# Patient Record
Sex: FEMALE | Race: WHITE | NOT HISPANIC OR LATINO | URBAN - METROPOLITAN AREA
[De-identification: names, ages, dates, MRNs, and addresses within clinical notes are randomized per-mention and may not be internally consistent; named-entity substitution may affect disease eponyms.]

---

## 2017-02-20 ENCOUNTER — INPATIENT (INPATIENT)
Facility: HOSPITAL | Age: 67
LOS: 13 days | Discharge: NURSING, REHAB OR LONG-TERM CARE | End: 2017-03-06
Attending: HOSPITALIST | Admitting: HOSPITALIST
Payer: MEDICARE

## 2017-02-20 VITALS
DIASTOLIC BLOOD PRESSURE: 95 MMHG | RESPIRATION RATE: 18 BRPM | SYSTOLIC BLOOD PRESSURE: 161 MMHG | HEART RATE: 97 BPM | TEMPERATURE: 98 F | OXYGEN SATURATION: 98 %

## 2017-02-20 DIAGNOSIS — C02.9 MALIGNANT NEOPLASM OF TONGUE, UNSPECIFIED: ICD-10-CM

## 2017-02-20 DIAGNOSIS — I10 ESSENTIAL (PRIMARY) HYPERTENSION: ICD-10-CM

## 2017-02-20 DIAGNOSIS — F32.9 MAJOR DEPRESSIVE DISORDER, SINGLE EPISODE, UNSPECIFIED: ICD-10-CM

## 2017-02-20 LAB
ALBUMIN SERPL ELPH-MCNC: 4.2 G/DL — SIGNIFICANT CHANGE UP (ref 3.3–5)
ALP SERPL-CCNC: 162 U/L — HIGH (ref 40–120)
ALT FLD-CCNC: 16 U/L — SIGNIFICANT CHANGE UP (ref 4–33)
AST SERPL-CCNC: 29 U/L — SIGNIFICANT CHANGE UP (ref 4–32)
BASE EXCESS BLDV CALC-SCNC: 3.8 MMOL/L — SIGNIFICANT CHANGE UP
BASOPHILS # BLD AUTO: 0.02 K/UL — SIGNIFICANT CHANGE UP (ref 0–0.2)
BASOPHILS NFR BLD AUTO: 0.2 % — SIGNIFICANT CHANGE UP (ref 0–2)
BILIRUB SERPL-MCNC: 0.4 MG/DL — SIGNIFICANT CHANGE UP (ref 0.2–1.2)
BLOOD GAS VENOUS - CREATININE: 0.84 MG/DL — SIGNIFICANT CHANGE UP (ref 0.5–1.3)
BUN SERPL-MCNC: 13 MG/DL — SIGNIFICANT CHANGE UP (ref 7–23)
CALCIUM SERPL-MCNC: 10.3 MG/DL — SIGNIFICANT CHANGE UP (ref 8.4–10.5)
CHLORIDE BLDV-SCNC: 94 MMOL/L — LOW (ref 96–108)
CHLORIDE SERPL-SCNC: 91 MMOL/L — LOW (ref 98–107)
CO2 SERPL-SCNC: 23 MMOL/L — SIGNIFICANT CHANGE UP (ref 22–31)
CREAT SERPL-MCNC: 0.91 MG/DL — SIGNIFICANT CHANGE UP (ref 0.5–1.3)
EOSINOPHIL # BLD AUTO: 0.07 K/UL — SIGNIFICANT CHANGE UP (ref 0–0.5)
EOSINOPHIL NFR BLD AUTO: 0.6 % — SIGNIFICANT CHANGE UP (ref 0–6)
GAS PNL BLDV: 135 MMOL/L — LOW (ref 136–146)
GLUCOSE BLDV-MCNC: 164 — HIGH (ref 70–99)
GLUCOSE SERPL-MCNC: 156 MG/DL — HIGH (ref 70–99)
HCO3 BLDV-SCNC: 25 MMOL/L — SIGNIFICANT CHANGE UP (ref 20–27)
HCT VFR BLD CALC: 44.8 % — SIGNIFICANT CHANGE UP (ref 34.5–45)
HCT VFR BLDV CALC: 46.9 % — HIGH (ref 34.5–45)
HGB BLD-MCNC: 15 G/DL — SIGNIFICANT CHANGE UP (ref 11.5–15.5)
HGB BLDV-MCNC: 15.3 G/DL — SIGNIFICANT CHANGE UP (ref 11.5–15.5)
IMM GRANULOCYTES NFR BLD AUTO: 0.2 % — SIGNIFICANT CHANGE UP (ref 0–1.5)
LACTATE BLDV-MCNC: 2.9 MMOL/L — HIGH (ref 0.5–2)
LYMPHOCYTES # BLD AUTO: 1.55 K/UL — SIGNIFICANT CHANGE UP (ref 1–3.3)
LYMPHOCYTES # BLD AUTO: 12.3 % — LOW (ref 13–44)
MCHC RBC-ENTMCNC: 29.6 PG — SIGNIFICANT CHANGE UP (ref 27–34)
MCHC RBC-ENTMCNC: 33.5 % — SIGNIFICANT CHANGE UP (ref 32–36)
MCV RBC AUTO: 88.5 FL — SIGNIFICANT CHANGE UP (ref 80–100)
MONOCYTES # BLD AUTO: 0.73 K/UL — SIGNIFICANT CHANGE UP (ref 0–0.9)
MONOCYTES NFR BLD AUTO: 5.8 % — SIGNIFICANT CHANGE UP (ref 2–14)
NEUTROPHILS # BLD AUTO: 10.25 K/UL — HIGH (ref 1.8–7.4)
NEUTROPHILS NFR BLD AUTO: 80.9 % — HIGH (ref 43–77)
PCO2 BLDV: 57 MMHG — HIGH (ref 41–51)
PH BLDV: 7.33 PH — SIGNIFICANT CHANGE UP (ref 7.32–7.43)
PLATELET # BLD AUTO: 396 K/UL — SIGNIFICANT CHANGE UP (ref 150–400)
PMV BLD: 9.7 FL — SIGNIFICANT CHANGE UP (ref 7–13)
PO2 BLDV: 27 MMHG — LOW (ref 35–40)
POTASSIUM BLDV-SCNC: 4.1 MMOL/L — SIGNIFICANT CHANGE UP (ref 3.4–4.5)
POTASSIUM SERPL-MCNC: 4.3 MMOL/L — SIGNIFICANT CHANGE UP (ref 3.5–5.3)
POTASSIUM SERPL-SCNC: 4.3 MMOL/L — SIGNIFICANT CHANGE UP (ref 3.5–5.3)
PROT SERPL-MCNC: 8.8 G/DL — HIGH (ref 6–8.3)
RBC # BLD: 5.06 M/UL — SIGNIFICANT CHANGE UP (ref 3.8–5.2)
RBC # FLD: 13.5 % — SIGNIFICANT CHANGE UP (ref 10.3–14.5)
SAO2 % BLDV: 38.6 % — LOW (ref 60–85)
SODIUM SERPL-SCNC: 135 MMOL/L — SIGNIFICANT CHANGE UP (ref 135–145)
TSH SERPL-MCNC: 3.89 UIU/ML — SIGNIFICANT CHANGE UP (ref 0.27–4.2)
WBC # BLD: 12.65 K/UL — HIGH (ref 3.8–10.5)
WBC # FLD AUTO: 12.65 K/UL — HIGH (ref 3.8–10.5)

## 2017-02-20 PROCEDURE — 70450 CT HEAD/BRAIN W/O DYE: CPT | Mod: 26

## 2017-02-20 PROCEDURE — 99223 1ST HOSP IP/OBS HIGH 75: CPT

## 2017-02-20 PROCEDURE — 71020: CPT | Mod: 26

## 2017-02-20 PROCEDURE — 70491 CT SOFT TISSUE NECK W/DYE: CPT | Mod: 26

## 2017-02-20 RX ORDER — SODIUM CHLORIDE 9 MG/ML
1000 INJECTION INTRAMUSCULAR; INTRAVENOUS; SUBCUTANEOUS ONCE
Qty: 0 | Refills: 0 | Status: COMPLETED | OUTPATIENT
Start: 2017-02-20 | End: 2017-02-20

## 2017-02-20 RX ORDER — FLUTICASONE PROPIONATE AND SALMETEROL 50; 250 UG/1; UG/1
1 POWDER ORAL; RESPIRATORY (INHALATION)
Qty: 0 | Refills: 0 | COMMUNITY

## 2017-02-20 RX ORDER — ARIPIPRAZOLE 15 MG/1
20 TABLET ORAL DAILY
Qty: 0 | Refills: 0 | Status: DISCONTINUED | OUTPATIENT
Start: 2017-02-20 | End: 2017-03-06

## 2017-02-20 RX ORDER — SODIUM CHLORIDE 9 MG/ML
1000 INJECTION INTRAMUSCULAR; INTRAVENOUS; SUBCUTANEOUS
Qty: 0 | Refills: 0 | Status: DISCONTINUED | OUTPATIENT
Start: 2017-02-20 | End: 2017-02-26

## 2017-02-20 RX ORDER — AMLODIPINE BESYLATE 2.5 MG/1
5 TABLET ORAL ONCE
Qty: 0 | Refills: 0 | Status: COMPLETED | OUTPATIENT
Start: 2017-02-20 | End: 2017-02-20

## 2017-02-20 RX ADMIN — Medication 100 MILLIGRAM(S): at 21:53

## 2017-02-20 RX ADMIN — AMLODIPINE BESYLATE 5 MILLIGRAM(S): 2.5 TABLET ORAL at 19:38

## 2017-02-20 RX ADMIN — SODIUM CHLORIDE 1000 MILLILITER(S): 9 INJECTION INTRAMUSCULAR; INTRAVENOUS; SUBCUTANEOUS at 12:20

## 2017-02-20 RX ADMIN — SODIUM CHLORIDE 75 MILLILITER(S): 9 INJECTION INTRAMUSCULAR; INTRAVENOUS; SUBCUTANEOUS at 20:03

## 2017-02-20 RX ADMIN — ARIPIPRAZOLE 20 MILLIGRAM(S): 15 TABLET ORAL at 21:54

## 2017-02-20 NOTE — ED PROVIDER NOTE - CARE PLAN
Principal Discharge DX:	Tongue cancer Principal Discharge DX:	Tongue cancer  Secondary Diagnosis:	Ambulatory dysfunction

## 2017-02-20 NOTE — H&P ADULT. - PROBLEM SELECTOR PLAN 1
-seen by ent who are recommending IR guided FNA  -spoke with IR fellow this evening, and advised to call at 8am tomorrow for formal consult request. Recommend npo pot-midnight in event that pt is scheduled tomorrow.  PLEASE ENSURE IR AWARE OF SUSPICIOUS FINDING IN LEFT NASAL CAVITY REPORTED ON CT  -bedside swallow eval ordered -seen by ent who are recommending IR guided FNA  -spoke with IR fellow this evening, and advised to call at 8am tomorrow for formal consult request [id number]. Recommend npo pot-midnight in event that pt is scheduled tomorrow.  PLEASE ENSURE IR AWARE OF SUSPICIOUS FINDING IN LEFT NASAL CAVITY REPORTED ON CT  -bedside swallow eval ordered

## 2017-02-20 NOTE — H&P ADULT. - HISTORY OF PRESENT ILLNESS
67 yo f with h/o depression/anxiety, htn, hld, well controlled asthma with no exacerbation for many years.  Notes profound fatigue that has worsened over last several weeks to point where pt unable to ambulate over last 1-2 days. Pt also endorsing difficultly swallowing/consequent anorexia in setting of right tongue lesion/pain that she first noticed last month??. Pt denies fevers, chills, coughing with po intake, or any dyspnea. Denies focal weakness, numbness or recent falls. CT head + small vessel white matter ischemic changes. CT neck :5.8 x2.5 x2.3 cm mass on right tongue with lateral area of ulceration. [place] tonsil with hypoglossus and genioglossus muscle involvement. 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 Polypoid soft-tissue focus in left nasal cavity

## 2017-02-20 NOTE — ED ADULT NURSE NOTE - OBJECTIVE STATEMENT
Pt AOx3, unkempt, poor hygiene c/o b/l leg and arm weakness. Pt noted to have tongue with ulcerated, bumps/nodules/tumors for over a month now causing slurring of speech this past week. Pt admits that this has caused her to eat less, not taking care of herself for months and not taking any of her prescribed medications. Equal strength bilaterally.  Pt denies pain, fever chills. Pt AOx3, unkempt, poor hygiene c/o b/l leg and arm weakness. Pt noted to have tongue with heterozygous partially oozing mass, nodules/tumors mostly on the right side for over a month now causing slurring of speech this past week. Pt admits that this has caused her to eat less, not taking care of herself for months and not taking any of her prescribed medications. Equal strength bilaterally.  Pt denies pain, fever chills.

## 2017-02-20 NOTE — ED PROVIDER NOTE - OBJECTIVE STATEMENT
Pt. is 67yo F PMHx HTN HLD Depression Anxiety p/w CC generalized weakness/dysarthria. Pt. states that over the past couple of days she has become progressively weak in both upper and lower extremities to the point that she can not even stand today - she also has been complaining of dysarthria and the sensation of "tongue swelling" for over a month. Pt. has not left the house in over a year. Denies fever, chills, CP, SOB, N/V/D, numbness/tingling.

## 2017-02-20 NOTE — ED PROVIDER NOTE - MEDICAL DECISION MAKING DETAILS
65yo F PMHx HTN HLD Depression p/w CC generalized weakness, dysarthria will send for cbc cmp vbg CT head, CT neck soft tissue, consult OMFS

## 2017-02-20 NOTE — H&P ADULT. - RADIOLOGY RESULTS AND INTERPRETATION
CT head + small vessel white matter ischemic changes. CT neck :5.8 x2.5 x2.3 cm mass on right tongue with lateral area of ulceration. [place] tonsil with hypoglossus and genioglossus muscle involvement. 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 Polypoid soft-tissue focus in left nasal cavity

## 2017-02-20 NOTE — ED ADULT TRIAGE NOTE - CHIEF COMPLAINT QUOTE
Brought in by ems from home for weakness to bilateral upper and lower extremities for "few days". Denies falls, fevers, or pain.  Hx of htn, depression and asthma.

## 2017-02-20 NOTE — ED PROVIDER NOTE - ATTENDING CONTRIBUTION TO CARE
66F p/w gen weakness and trouble speaking/swallowing.  Pt reports unable to stand or walk even a few feet.  Notes also tongue swelling x 1 month.  VS:  unremarkable except hypertensive    GEN - mild distress anxiety; A+O x3   HEAD - NC/AT     ENT - PEERL, EOMI, mucous membranes  dry , no discharge.  Large heterogeneous tongue mass on R tongue with areas of necrosis and fibrinous exudates throughout, fungating mass.  +LAD      NECK: Neck supple, non-tender, no JVD  PULM - CTA b/l,  symmetric breath sounds  COR -  normal heart sounds    ABD - , ND, NT, soft, no guarding, no rebound, no masses    BACK - no CVA tenderness, nontender spine     EXTREMS - no edema, no deformity, warm and well perfused    SKIN - no rash or bruising      NEUROLOGIC - alert, CN 2-12 intact, sensation nl, motor 5/5 RUE/LUE/RLE/LLE.      IMP:  66F p/w gen weakness and trouble speaking and swallowing.  Large fungating tongue mass noted.  Will check CT ST neck with contrast to eval extent and for possible mets.  OMFS eval - suggested ENT eval, completed.  Pt unable to walk still, despite no focal weakness and relatively normal CT brain.  Rx fluids, check labs, admit.

## 2017-02-20 NOTE — H&P ADULT. - PROBLEM SELECTOR PLAN 3
pt with h/o htn, pravin also endorsing active  anxiety; has not taken htn med since  PMD retired 2 years ago; will initiate norvasc for now especially in light of importance to adequately hydrate via IVF  -receptive to establishing care with a PCP once again pt with h/o htn, pravin also endorsing active  anxiety; has not taken htn med since  PMD retired 2 years ago; will initiate norvasc for now especially in light of importance to adequately hydrate via IVF  -a1c, lipid panel ordered for am  -receptive to establishing care with a PCP once again

## 2017-02-21 LAB
APTT BLD: 31.6 SEC — SIGNIFICANT CHANGE UP (ref 27.5–37.4)
BUN SERPL-MCNC: 11 MG/DL — SIGNIFICANT CHANGE UP (ref 7–23)
CALCIUM SERPL-MCNC: 9.3 MG/DL — SIGNIFICANT CHANGE UP (ref 8.4–10.5)
CHLORIDE SERPL-SCNC: 93 MMOL/L — LOW (ref 98–107)
CHOLEST SERPL-MCNC: 242 MG/DL — HIGH (ref 120–199)
CO2 SERPL-SCNC: 24 MMOL/L — SIGNIFICANT CHANGE UP (ref 22–31)
CREAT SERPL-MCNC: 0.78 MG/DL — SIGNIFICANT CHANGE UP (ref 0.5–1.3)
GLUCOSE SERPL-MCNC: 135 MG/DL — HIGH (ref 70–99)
HBA1C BLD-MCNC: 7.4 % — HIGH (ref 4–5.6)
HCT VFR BLD CALC: 39.4 % — SIGNIFICANT CHANGE UP (ref 34.5–45)
HDLC SERPL-MCNC: 34 MG/DL — LOW (ref 45–65)
HGB BLD-MCNC: 13 G/DL — SIGNIFICANT CHANGE UP (ref 11.5–15.5)
INR BLD: 1.09 — SIGNIFICANT CHANGE UP (ref 0.87–1.18)
LACTATE SERPL-SCNC: 1.1 MMOL/L — SIGNIFICANT CHANGE UP (ref 0.5–2)
LIPID PNL WITH DIRECT LDL SERPL: 180 MG/DL — SIGNIFICANT CHANGE UP
MCHC RBC-ENTMCNC: 29.2 PG — SIGNIFICANT CHANGE UP (ref 27–34)
MCHC RBC-ENTMCNC: 33 % — SIGNIFICANT CHANGE UP (ref 32–36)
MCV RBC AUTO: 88.5 FL — SIGNIFICANT CHANGE UP (ref 80–100)
PLATELET # BLD AUTO: 376 K/UL — SIGNIFICANT CHANGE UP (ref 150–400)
PMV BLD: 10.1 FL — SIGNIFICANT CHANGE UP (ref 7–13)
POTASSIUM SERPL-MCNC: 3.7 MMOL/L — SIGNIFICANT CHANGE UP (ref 3.5–5.3)
POTASSIUM SERPL-SCNC: 3.7 MMOL/L — SIGNIFICANT CHANGE UP (ref 3.5–5.3)
PROTHROM AB SERPL-ACNC: 12.4 SEC — SIGNIFICANT CHANGE UP (ref 10–13.1)
RBC # BLD: 4.45 M/UL — SIGNIFICANT CHANGE UP (ref 3.8–5.2)
RBC # FLD: 13.6 % — SIGNIFICANT CHANGE UP (ref 10.3–14.5)
SODIUM SERPL-SCNC: 136 MMOL/L — SIGNIFICANT CHANGE UP (ref 135–145)
TRIGL SERPL-MCNC: 150 MG/DL — HIGH (ref 10–149)
WBC # BLD: 9.15 K/UL — SIGNIFICANT CHANGE UP (ref 3.8–10.5)
WBC # FLD AUTO: 9.15 K/UL — SIGNIFICANT CHANGE UP (ref 3.8–10.5)

## 2017-02-21 PROCEDURE — 99233 SBSQ HOSP IP/OBS HIGH 50: CPT

## 2017-02-21 RX ORDER — DEXTROSE 50 % IN WATER 50 %
1 SYRINGE (ML) INTRAVENOUS ONCE
Qty: 0 | Refills: 0 | Status: DISCONTINUED | OUTPATIENT
Start: 2017-02-21 | End: 2017-03-06

## 2017-02-21 RX ORDER — INSULIN LISPRO 100/ML
VIAL (ML) SUBCUTANEOUS
Qty: 0 | Refills: 0 | Status: DISCONTINUED | OUTPATIENT
Start: 2017-02-21 | End: 2017-03-06

## 2017-02-21 RX ORDER — SODIUM CHLORIDE 9 MG/ML
1000 INJECTION, SOLUTION INTRAVENOUS
Qty: 0 | Refills: 0 | Status: DISCONTINUED | OUTPATIENT
Start: 2017-02-21 | End: 2017-03-06

## 2017-02-21 RX ORDER — INSULIN LISPRO 100/ML
VIAL (ML) SUBCUTANEOUS AT BEDTIME
Qty: 0 | Refills: 0 | Status: DISCONTINUED | OUTPATIENT
Start: 2017-02-21 | End: 2017-03-06

## 2017-02-21 RX ORDER — ARIPIPRAZOLE 15 MG/1
1 TABLET ORAL
Qty: 0 | Refills: 0 | COMMUNITY

## 2017-02-21 RX ORDER — DEXTROSE 50 % IN WATER 50 %
25 SYRINGE (ML) INTRAVENOUS ONCE
Qty: 0 | Refills: 0 | Status: DISCONTINUED | OUTPATIENT
Start: 2017-02-21 | End: 2017-03-06

## 2017-02-21 RX ORDER — GLUCAGON INJECTION, SOLUTION 0.5 MG/.1ML
1 INJECTION, SOLUTION SUBCUTANEOUS ONCE
Qty: 0 | Refills: 0 | Status: DISCONTINUED | OUTPATIENT
Start: 2017-02-21 | End: 2017-03-06

## 2017-02-21 RX ORDER — AMLODIPINE BESYLATE 2.5 MG/1
5 TABLET ORAL ONCE
Qty: 0 | Refills: 0 | Status: COMPLETED | OUTPATIENT
Start: 2017-02-21 | End: 2017-02-21

## 2017-02-21 RX ORDER — DEXTROSE 50 % IN WATER 50 %
12.5 SYRINGE (ML) INTRAVENOUS ONCE
Qty: 0 | Refills: 0 | Status: DISCONTINUED | OUTPATIENT
Start: 2017-02-21 | End: 2017-03-06

## 2017-02-21 RX ADMIN — SODIUM CHLORIDE 75 MILLILITER(S): 9 INJECTION INTRAMUSCULAR; INTRAVENOUS; SUBCUTANEOUS at 22:16

## 2017-02-21 RX ADMIN — AMLODIPINE BESYLATE 5 MILLIGRAM(S): 2.5 TABLET ORAL at 12:39

## 2017-02-21 RX ADMIN — Medication 100 MILLIGRAM(S): at 16:55

## 2017-02-21 RX ADMIN — ARIPIPRAZOLE 20 MILLIGRAM(S): 15 TABLET ORAL at 11:28

## 2017-02-21 RX ADMIN — Medication 100 MILLIGRAM(S): at 05:10

## 2017-02-22 LAB
BUN SERPL-MCNC: 7 MG/DL — SIGNIFICANT CHANGE UP (ref 7–23)
CALCIUM SERPL-MCNC: 9 MG/DL — SIGNIFICANT CHANGE UP (ref 8.4–10.5)
CHLORIDE SERPL-SCNC: 95 MMOL/L — LOW (ref 98–107)
CO2 SERPL-SCNC: 26 MMOL/L — SIGNIFICANT CHANGE UP (ref 22–31)
CREAT SERPL-MCNC: 0.64 MG/DL — SIGNIFICANT CHANGE UP (ref 0.5–1.3)
GLUCOSE SERPL-MCNC: 124 MG/DL — HIGH (ref 70–99)
HCT VFR BLD CALC: 38 % — SIGNIFICANT CHANGE UP (ref 34.5–45)
HGB BLD-MCNC: 12.6 G/DL — SIGNIFICANT CHANGE UP (ref 11.5–15.5)
MCHC RBC-ENTMCNC: 29.2 PG — SIGNIFICANT CHANGE UP (ref 27–34)
MCHC RBC-ENTMCNC: 33.2 % — SIGNIFICANT CHANGE UP (ref 32–36)
MCV RBC AUTO: 88 FL — SIGNIFICANT CHANGE UP (ref 80–100)
PLATELET # BLD AUTO: 328 K/UL — SIGNIFICANT CHANGE UP (ref 150–400)
PMV BLD: 9.4 FL — SIGNIFICANT CHANGE UP (ref 7–13)
POTASSIUM SERPL-MCNC: 3.6 MMOL/L — SIGNIFICANT CHANGE UP (ref 3.5–5.3)
POTASSIUM SERPL-SCNC: 3.6 MMOL/L — SIGNIFICANT CHANGE UP (ref 3.5–5.3)
RBC # BLD: 4.32 M/UL — SIGNIFICANT CHANGE UP (ref 3.8–5.2)
RBC # FLD: 13.4 % — SIGNIFICANT CHANGE UP (ref 10.3–14.5)
SODIUM SERPL-SCNC: 136 MMOL/L — SIGNIFICANT CHANGE UP (ref 135–145)
WBC # BLD: 9.83 K/UL — SIGNIFICANT CHANGE UP (ref 3.8–10.5)
WBC # FLD AUTO: 9.83 K/UL — SIGNIFICANT CHANGE UP (ref 3.8–10.5)

## 2017-02-22 PROCEDURE — 99223 1ST HOSP IP/OBS HIGH 75: CPT | Mod: 25,GC

## 2017-02-22 PROCEDURE — 31575 DIAGNOSTIC LARYNGOSCOPY: CPT | Mod: GC

## 2017-02-22 PROCEDURE — 99233 SBSQ HOSP IP/OBS HIGH 50: CPT

## 2017-02-22 RX ORDER — AMLODIPINE BESYLATE 2.5 MG/1
10 TABLET ORAL DAILY
Qty: 0 | Refills: 0 | Status: DISCONTINUED | OUTPATIENT
Start: 2017-02-22 | End: 2017-03-06

## 2017-02-22 RX ADMIN — SODIUM CHLORIDE 75 MILLILITER(S): 9 INJECTION INTRAMUSCULAR; INTRAVENOUS; SUBCUTANEOUS at 12:40

## 2017-02-22 RX ADMIN — AMLODIPINE BESYLATE 10 MILLIGRAM(S): 2.5 TABLET ORAL at 16:23

## 2017-02-22 RX ADMIN — ARIPIPRAZOLE 20 MILLIGRAM(S): 15 TABLET ORAL at 12:40

## 2017-02-22 RX ADMIN — Medication 100 MILLIGRAM(S): at 17:29

## 2017-02-22 RX ADMIN — Medication 100 MILLIGRAM(S): at 06:54

## 2017-02-22 NOTE — SWALLOW BEDSIDE ASSESSMENT ADULT - COMMENTS
Attempted to see patient for an initial assessment of swallow function this am at which time ADS (spectra #[id number]) reports patient is NPO for testing today. ADS requesting formal swallow evaluation be held until tomorrow, 2/23/2017 at this time. This dept to f/u for formal assessment on 2/23/2017, as per MD request.

## 2017-02-23 ENCOUNTER — RESULT REVIEW (OUTPATIENT)
Age: 67
End: 2017-02-23

## 2017-02-23 ENCOUNTER — TRANSCRIPTION ENCOUNTER (OUTPATIENT)
Age: 67
End: 2017-02-23

## 2017-02-23 LAB
BUN SERPL-MCNC: 8 MG/DL — SIGNIFICANT CHANGE UP (ref 7–23)
CALCIUM SERPL-MCNC: 9.2 MG/DL — SIGNIFICANT CHANGE UP (ref 8.4–10.5)
CHLORIDE SERPL-SCNC: 97 MMOL/L — LOW (ref 98–107)
CHOLEST SERPL-MCNC: 253 MG/DL — HIGH (ref 120–199)
CO2 SERPL-SCNC: 24 MMOL/L — SIGNIFICANT CHANGE UP (ref 22–31)
CREAT SERPL-MCNC: 0.67 MG/DL — SIGNIFICANT CHANGE UP (ref 0.5–1.3)
GLUCOSE SERPL-MCNC: 151 MG/DL — HIGH (ref 70–99)
HCT VFR BLD CALC: 40.4 % — SIGNIFICANT CHANGE UP (ref 34.5–45)
HDLC SERPL-MCNC: 39 MG/DL — LOW (ref 45–65)
HGB BLD-MCNC: 13.3 G/DL — SIGNIFICANT CHANGE UP (ref 11.5–15.5)
INR BLD: 1.09 — SIGNIFICANT CHANGE UP (ref 0.87–1.18)
LIPID PNL WITH DIRECT LDL SERPL: 183 MG/DL — SIGNIFICANT CHANGE UP
MCHC RBC-ENTMCNC: 29.2 PG — SIGNIFICANT CHANGE UP (ref 27–34)
MCHC RBC-ENTMCNC: 32.9 % — SIGNIFICANT CHANGE UP (ref 32–36)
MCV RBC AUTO: 88.8 FL — SIGNIFICANT CHANGE UP (ref 80–100)
PLATELET # BLD AUTO: 341 K/UL — SIGNIFICANT CHANGE UP (ref 150–400)
PMV BLD: 10 FL — SIGNIFICANT CHANGE UP (ref 7–13)
POTASSIUM SERPL-MCNC: 3.3 MMOL/L — LOW (ref 3.5–5.3)
POTASSIUM SERPL-SCNC: 3.3 MMOL/L — LOW (ref 3.5–5.3)
PROTHROM AB SERPL-ACNC: 12.4 SEC — SIGNIFICANT CHANGE UP (ref 10–13.1)
RBC # BLD: 4.55 M/UL — SIGNIFICANT CHANGE UP (ref 3.8–5.2)
RBC # FLD: 13.5 % — SIGNIFICANT CHANGE UP (ref 10.3–14.5)
SODIUM SERPL-SCNC: 138 MMOL/L — SIGNIFICANT CHANGE UP (ref 135–145)
TRIGL SERPL-MCNC: 141 MG/DL — SIGNIFICANT CHANGE UP (ref 10–149)
WBC # BLD: 7.75 K/UL — SIGNIFICANT CHANGE UP (ref 3.8–10.5)
WBC # FLD AUTO: 7.75 K/UL — SIGNIFICANT CHANGE UP (ref 3.8–10.5)

## 2017-02-23 PROCEDURE — 70498 CT ANGIOGRAPHY NECK: CPT | Mod: 26,52

## 2017-02-23 PROCEDURE — 70553 MRI BRAIN STEM W/O & W/DYE: CPT | Mod: 26

## 2017-02-23 PROCEDURE — 99233 SBSQ HOSP IP/OBS HIGH 50: CPT

## 2017-02-23 PROCEDURE — 99223 1ST HOSP IP/OBS HIGH 75: CPT | Mod: GC

## 2017-02-23 PROCEDURE — 70496 CT ANGIOGRAPHY HEAD: CPT | Mod: 26

## 2017-02-23 RX ORDER — ATORVASTATIN CALCIUM 80 MG/1
80 TABLET, FILM COATED ORAL DAILY
Qty: 0 | Refills: 0 | Status: DISCONTINUED | OUTPATIENT
Start: 2017-02-23 | End: 2017-02-25

## 2017-02-23 RX ORDER — POTASSIUM CHLORIDE 20 MEQ
10 PACKET (EA) ORAL
Qty: 0 | Refills: 0 | Status: COMPLETED | OUTPATIENT
Start: 2017-02-23 | End: 2017-02-23

## 2017-02-23 RX ORDER — ASPIRIN/CALCIUM CARB/MAGNESIUM 324 MG
81 TABLET ORAL DAILY
Qty: 0 | Refills: 0 | Status: DISCONTINUED | OUTPATIENT
Start: 2017-02-23 | End: 2017-03-06

## 2017-02-23 RX ADMIN — Medication 81 MILLIGRAM(S): at 11:33

## 2017-02-23 RX ADMIN — Medication 100 MILLIEQUIVALENT(S): at 11:12

## 2017-02-23 RX ADMIN — Medication 100 MILLIEQUIVALENT(S): at 09:22

## 2017-02-23 RX ADMIN — Medication 100 MILLIGRAM(S): at 17:56

## 2017-02-23 RX ADMIN — SODIUM CHLORIDE 75 MILLILITER(S): 9 INJECTION INTRAMUSCULAR; INTRAVENOUS; SUBCUTANEOUS at 23:55

## 2017-02-23 RX ADMIN — Medication 100 MILLIGRAM(S): at 06:24

## 2017-02-23 RX ADMIN — Medication 100 MILLIEQUIVALENT(S): at 12:18

## 2017-02-23 RX ADMIN — AMLODIPINE BESYLATE 10 MILLIGRAM(S): 2.5 TABLET ORAL at 06:24

## 2017-02-23 RX ADMIN — ARIPIPRAZOLE 20 MILLIGRAM(S): 15 TABLET ORAL at 11:34

## 2017-02-23 NOTE — DISCHARGE NOTE ADULT - CARE PLAN
Principal Discharge DX:	Tongue cancer  Secondary Diagnosis:	Essential hypertension  Secondary Diagnosis:	Ambulatory dysfunction  Secondary Diagnosis:	Depression, unspecified depression type Principal Discharge DX:	Tongue cancer  Instructions for follow-up, activity and diet:	Pending FNA  Secondary Diagnosis:	Essential hypertension  Instructions for follow-up, activity and diet:	Continue home BP medications  Secondary Diagnosis:	Ambulatory dysfunction  Secondary Diagnosis:	Depression, unspecified depression type Principal Discharge DX:	Tongue cancer  Goal:	will Need Radiation treatment after Rehab  Instructions for follow-up, activity and diet:	Once Rehab is Completed, Please Call Dr. Kapil Chavez  for appointment to begin Radiation  Secondary Diagnosis:	Essential hypertension  Instructions for follow-up, activity and diet:	Continue home BP medications  Secondary Diagnosis:	Ambulatory dysfunction  Secondary Diagnosis:	Depression, unspecified depression type  Secondary Diagnosis:	CVA (cerebral vascular accident)  Instructions for follow-up, activity and diet:	Follow up with Dr. Libman Principal Discharge DX:	Tongue cancer  Goal:	will Need Radiation treatment after Rehab  Instructions for follow-up, activity and diet:	Once Rehab is Completed, Please Call Dr. Kapil Chavez  for appointment to begin Radiation  Secondary Diagnosis:	CVA (cerebral vascular accident)  Goal:	Symptom resolution, medication compliance, prevent disease progression  Instructions for follow-up, activity and diet:	Continue home BP medications  Secondary Diagnosis:	Essential hypertension  Goal:	Monitor BP, medication compliance, prevent end organ damage  Instructions for follow-up, activity and diet:	Follow up with Dr. Libman Principal Discharge DX:	Tongue cancer  Goal:	will Need Radiation treatment after Rehab  Instructions for follow-up, activity and diet:	Once Rehab is Completed, Please Call Dr. Kapil Chavez  for appointment to begin Radiation, [phone], located at [address], Department of Radiation Medicine.   When your at [facility], please contact Consuelo Mendoza in regards to setting up radiation treatment. [phone].  Outpatient follow-up with Oncologist at [facility].  Secondary Diagnosis:	CVA (cerebral vascular accident)  Goal:	Symptom resolution, medication compliance, prevent disease progression  Instructions for follow-up, activity and diet:	Continue ASA, Lipitor.  Follow-up with Neurologist Dr. Libman.  Secondary Diagnosis:	Essential hypertension  Goal:	Monitor BP, medication compliance, prevent end organ damage  Instructions for follow-up, activity and diet:	Continue Norvasc, Lisinopril.  Monitor your blood pressure. Principal Discharge DX:	Tongue cancer  Goal:	will Need Radiation treatment after Rehab  Instructions for follow-up, activity and diet:	Once Rehab is Completed, Please Call Dr. Kapil Chavez  for appointment to begin Radiation, [phone], located at [address], Department of Radiation Medicine.   When your at [facility], please contact Consuelo Mendoza in regards to setting up radiation treatment. [phone].  Outpatient follow-up with Oncologist at [facility].  Secondary Diagnosis:	CVA (cerebral vascular accident)  Goal:	Symptom resolution, medication compliance, prevent disease progression  Instructions for follow-up, activity and diet:	Continue ASA, Lipitor.  Follow-up with Neurologist Dr. Libman.  Secondary Diagnosis:	Essential hypertension  Goal:	Monitor BP, medication compliance, prevent end organ damage  Instructions for follow-up, activity and diet:	Continue Norvasc, Lisinopril.  Monitor your blood pressure. Principal Discharge DX:	Tongue cancer  Goal:	will Need Radiation treatment after Rehab  Instructions for follow-up, activity and diet:	Once Rehab is Completed, Please Call Dr. Kapil Chavez  for appointment to begin Radiation, [phone], located at [address], Department of Radiation Medicine.   When your at [facility], please contact Consuelo Mendoza in regards to setting up radiation treatment. [phone].  Outpatient follow-up with Oncologist at [facility].  Secondary Diagnosis:	CVA (cerebral vascular accident)  Goal:	Symptom resolution, medication compliance, prevent disease progression  Instructions for follow-up, activity and diet:	Continue ASA, Lipitor.  Follow-up with Neurologist Dr. Libman.  Secondary Diagnosis:	Essential hypertension  Goal:	Monitor BP, medication compliance, prevent end organ damage  Instructions for follow-up, activity and diet:	Continue Norvasc, Lisinopril.  Monitor your blood pressure. Principal Discharge DX:	Tongue cancer  Goal:	will Need Radiation treatment after Rehab  Instructions for follow-up, activity and diet:	Once Rehab is Completed, Please Call Dr. Kapil Chavez  for appointment to begin Radiation, [phone], located at [address], Department of Radiation Medicine.   When your at [facility], please contact Consuelo Mendoza in regards to setting up radiation treatment. [phone].  Outpatient follow-up with Oncologist at [facility].  Secondary Diagnosis:	CVA (cerebral vascular accident)  Goal:	Symptom resolution, medication compliance, prevent disease progression  Instructions for follow-up, activity and diet:	Continue ASA, Lipitor.  Follow-up with Neurologist Dr. Libman.  Secondary Diagnosis:	Essential hypertension  Goal:	Monitor BP, medication compliance, prevent end organ damage  Instructions for follow-up, activity and diet:	Continue Norvasc, Lisinopril.  Monitor your blood pressure. Principal Discharge DX:	Tongue cancer  Goal:	will Need Radiation treatment after Rehab  Instructions for follow-up, activity and diet:	Once Rehab is Completed, Please Call Dr. Kapil Chavez  for appointment to begin Radiation, [phone], located at [address], Department of Radiation Medicine.   When your at [facility], please contact Consuelo Mendoza in regards to setting up radiation treatment. [phone].  Outpatient follow-up with Oncologist at [facility].  Secondary Diagnosis:	CVA (cerebral vascular accident)  Goal:	Symptom resolution, medication compliance, prevent disease progression  Instructions for follow-up, activity and diet:	Continue ASA, Lipitor.  Follow-up with Neurologist Dr. Libman.  Secondary Diagnosis:	Essential hypertension  Goal:	Monitor BP, medication compliance, prevent end organ damage  Instructions for follow-up, activity and diet:	Continue Norvasc, Lisinopril.  Monitor your blood pressure. Principal Discharge DX:	Tongue cancer  Goal:	will Need Radiation treatment after Rehab  Instructions for follow-up, activity and diet:	Once Rehab is Completed, Please Call Dr. Kapil Chavez  for appointment to begin Radiation, [phone], located at [address], Department of Radiation Medicine.   When your at [facility], please contact Consuelo Mendoza in regards to setting up radiation treatment. [phone].  Outpatient follow-up with Oncologist at [facility].  Secondary Diagnosis:	CVA (cerebral vascular accident)  Goal:	Symptom resolution, medication compliance, prevent disease progression  Instructions for follow-up, activity and diet:	Continue ASA, Lipitor.  Follow-up with Neurologist Dr. Libman.  Secondary Diagnosis:	Essential hypertension  Goal:	Monitor BP, medication compliance, prevent end organ damage  Instructions for follow-up, activity and diet:	Continue Norvasc, Lisinopril.  Monitor your blood pressure.

## 2017-02-23 NOTE — DISCHARGE NOTE ADULT - CARE PROVIDERS DIRECT ADDRESSES
,DirectAddress_Unknown,DirectAddress_Unknown ,[email or web address],DirectAddress_Unknown ,[email or web address],DirectAddress_Unknown

## 2017-02-23 NOTE — DISCHARGE NOTE ADULT - CARE PROVIDER_API CALL
Colt Marks (MBBS), Neurology  [address] [place] [address]  Phone: [phone]  Fax: [phone] Libman, Richard B (MD), Neurology; Vascular Neurology  [address] [place] [address]  Phone: [phone]  Fax: [phone] Libman, Richard B (MD), Neurology; Vascular Neurology  [address] [place] [address]  Phone: [phone]  Fax: [phone]    Kapil Chavez), Radiation Oncology  [address] [place] [address] [place] [address]  Phone: [phone]  Fax: [phone]

## 2017-02-23 NOTE — DISCHARGE NOTE ADULT - ADDITIONAL INSTRUCTIONS
For Rehab staff:  -  [facility]. [phone] For Rehab staff:  -  [facility]. [phone].  Follow-up with Neurologist Dr. Libman.  Outpatient follow-up with Oncologist at [facility].

## 2017-02-23 NOTE — DISCHARGE NOTE ADULT - SECONDARY DIAGNOSIS.
Essential hypertension Ambulatory dysfunction Depression, unspecified depression type CVA (cerebral vascular accident)

## 2017-02-23 NOTE — DISCHARGE NOTE ADULT - PATIENT PORTAL LINK FT
“You can access the FollowHealth Patient Portal, offered by [facility], by registering with the following website: [email or web address]”

## 2017-02-23 NOTE — DISCHARGE NOTE ADULT - PLAN OF CARE
Pending FNA Continue home BP medications will Need Radiation treatment after Rehab Follow up with Dr. Libman Once Rehab is Completed, Please Call Dr. Kapil Chavez  for appointment to begin Radiation Symptom resolution, medication compliance, prevent disease progression Monitor BP, medication compliance, prevent end organ damage Continue Norvasc, Lisinopril.  Monitor your blood pressure. Continue ASA, Lipitor.  Follow-up with Neurologist Dr. Libman. Once Rehab is Completed, Please Call Dr. Kapil Chavez  for appointment to begin Radiation, [phone], located at [address], Department of Radiation Medicine.   [facility], please contact Consuelo Mendoza in regards to setting up radiation treatment. [phone].  Outpatient follow-up with Oncologist at [facility]. Once Rehab is Completed, Please Call Dr. Kapil Chavez  for appointment to begin Radiation, [phone], located at [address], Department of Radiation Medicine.   [facility], please contact Consuelo Mendoza in regards to setting up radiation treatment. [phone].  Outpatient follow-up with Oncologist at [facility].

## 2017-02-23 NOTE — DISCHARGE NOTE ADULT - HOSPITAL COURSE
67 yo f with h/o depression/anxiety, htn, hld, well controlled asthma with no exacerbation for many years.  Notes profound fatigue that has worsened over last several weeks to point where pt unable to ambulate over last 1-2 days. Pt also endorsing difficultly swallowing/consequent anorexia in setting of right tongue lesion/pain that she first noticed last month??. CT head + small vessel white matter ischemic changes. CT neck :5.8 x2.5 x2.3 cm mass on right tongue with lateral area of ulceration. [place] tonsil with hypoglossus and genioglossus muscle involvement. 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 Pt also endorsing Left sided weakness f/o MRI showing right posterior corona radiata acute vs subacute infarct 65 yo f with h/o depression/anxiety, htn, hld, well controlled asthma with no exacerbation for many years.  Notes profound fatigue that has worsened over last several weeks to point where pt unable to ambulate over last 1-2 days. Pt also endorsing difficultly swallowing/consequent anorexia in setting of right tongue lesion/pain that she first noticed last month??. CT head + small vessel white matter ischemic changes. CT neck :5.8 x2.5 x2.3 cm mass on right tongue with lateral area of ulceration. [place] tonsil with hypoglossus and genioglossus muscle involvement. 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 Pt also endorsing Left sided weakness f/o MRI showing right posterior corona radiata acute vs subacute infarct    Essential hypertension.    pt with h/o htn, pravin also endorsing active anxiety; has not taken htn med since  PMD retired 2 years ago; will initiate norvasc for now especially in light of importance to adequately hydrate via IVF  -a1c, lipid panel ordered for am  -receptive to establishing care with a PCP once again.   2/23 MRI: No abnormal intracranial enhancement to suggest intracranial metastasis.Right posterior corona radiata acute versus acute/subacute infarct. Mild to moderate microvascular ischemic  2/23 Neuro: New tongue mass, increased difficulty with ambulation and left sided weakness and sensory defect with acute and subacute Right posterior limb of CR infarct- occured 3 days ago, 2/2 small vessel disease: CTA H/N with contrast, TTWE with bubble study, telemetry monitoring, start ASA 81 mg daily, alternate statin if pt has intolerance, PT/OT, no neurologic CI to FNA  2/23 Med: Acute right posteior corona radiata infarct as confirmed by MRI, tele monitoring, statin, endophytic mass with lymph node mets, bx by IR postponed for 2 days, HTN c/w Norvasc.  2/24- CTA Head & Neck- No abnormal intracranial enhancement. Mild microvascular ischemic change.  Intracranial and extracranial CT angiography demonstrate no significant   stenosis, or aneurysm.  2/16 Med: Acute CVA, on asa, lipitor ( no true allergy) , F/U Biopsy , Increase Lisinopril to 10 mg  02/26 Echo - EF 57%  1. Normal mitral valve.  2. Normal trileaflet aortic valve.  3. Endocardium not well visualized; grossly normal left  ventricular systolic function.  4. Reversal of the E-A waves of the mitral inflow pattern  consistent with reduced compliance of the left ventricle.  5. Normal right ventricular size and function.  2/24-- s/p tongue bx>> Squamous cell carcinoma, moderately differentiated - Perineural invasion is identified within tumor  2/27-- oncology-- CT c/a/p for staging, call radiation oncology consult, wait for HPV pathology, follow up on ENT recommendations, if any additional studies they want to do,   NUTRITION 2/27-- Glucerna Shake 3x daily   2/28 Med: will try to convince pt to get MRI H/N, cont ASA/Lipitor for CVA, consult with radiation onc, dispo to acute rehab. HTN- monitor BP- lisinopril titrated.   2/28 Neuro: cont ASA 81mg, OOB to chair, neurologically cleared for discharge, no neurologic contraindications to head/neck radiation therapy.   2/28 Rad Onc: Pt will need Chemo/RT (35 day treatment) M-F once stable for D/C. F/U as outpatient, to f/u with MD at [facility] for further consideration.    2/28 Onc: Pt may be referred to [facility] [place] while she is in Rehab.  CT Abd/Chest: No evidence of metastatic disease to the chest,abdomen or pelvis.  3/1/17 > Attdg > Medically optimized for Rehab 65 yo f with h/o depression/anxiety, htn, hld, well controlled asthma with no exacerbation for many years.  Notes profound fatigue that has worsened over last several weeks to point where pt unable to ambulate over last 1-2 days. Pt also endorsing difficultly swallowing/consequent anorexia in setting of right tongue lesion/pain that she first noticed last month. CT head + small vessel white matter ischemic changes. CT neck :5.8 x 2.5 x 2.3 cm mass on right tongue with lateral area of ulceration. [place] tonsil with hypoglossus and genioglossus muscle involvement. 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 Pt also endorsing Left sided weakness, MRI showing right posterior corona radiata acute vs subacute infarct.    Essential hypertension.    pt with h/o htn, pravin also endorsing active anxiety; has not taken htn med since PMD retired 2 years ago; will initiate norvasc for now especially in light of importance to adequately hydrate via IVF  -a1c, lipid panel  -receptive to establishing care with a PCP once again.   2/23 MRI: No abnormal intracranial enhancement to suggest intracranial metastasis.Right posterior corona radiata acute versus acute/subacute infarct. Mild to moderate microvascular ischemic  2/23 Neuro: New tongue mass, increased difficulty with ambulation and left sided weakness and sensory defect with acute and subacute Right posterior limb of CR infarct- occurred 3 days ago, 2/2 small vessel disease: CTA H/N with contrast, TTE with bubble study, telemetry monitoring, start ASA 81 mg daily, alternate statin if pt has intolerance, PT/OT, no neurologic CI to FNA  2/23 Med: Acute right posteior corona radiata infarct as confirmed by MRI, tele monitoring, statin, endophytic mass with lymph node mets, bx by IR postponed for 2 days, HTN c/w Norvasc.  2/24- CTA Head & Neck- No abnormal intracranial enhancement. Mild microvascular ischemic change.  Intracranial and extracranial CT angiography demonstrate no significant   stenosis, or aneurysm.  2/16 Med: Acute CVA, on asa, lipitor ( no true allergy) , F/U Biopsy , Increase Lisinopril to 10 mg  02/26 Echo - EF 57%  1. Normal mitral valve.  2. Normal trileaflet aortic valve.  3. Endocardium not well visualized; grossly normal left  ventricular systolic function.  4. Reversal of the E-A waves of the mitral inflow pattern  consistent with reduced compliance of the left ventricle.  5. Normal right ventricular size and function.  2/24-- s/p tongue bx>> Squamous cell carcinoma, moderately differentiated - Perineural invasion is identified within tumor  2/27-- oncology-- CT c/a/p for staging, call radiation oncology consult, wait for HPV pathology, follow up on ENT recommendations, if any additional studies they want to do,   NUTRITION 2/27-- Glucerna Shake 3x daily   2/28 Med: will try to convince pt to get MRI H/N, cont ASA/Lipitor for CVA, consult with radiation onc, dispo to acute rehab. HTN- monitor BP- lisinopril titrated.   2/28 Neuro: cont ASA 81mg, OOB to chair, neurologically cleared for discharge, no neurologic contraindications to head/neck radiation therapy.   2/28 Rad Onc: Pt will need Chemo/RT (35 day treatment) M-F once stable for D/C. F/U as outpatient, to [facility] at [facility] for further consideration.    2/28 Onc: Pt may be referred to [facility] [place] while she is in Rehab.  CT Abd/Chest: No evidence of metastatic disease to the chest,abdomen or pelvis.  3/1/17 Med: Medically optimized for Rehab   3/3/17 Pt is medically stable for discharge to Rehab today as per Dr. Edwards. 67 yo f with h/o depression/anxiety, htn, hld, well controlled asthma with no exacerbation for many years.  Notes profound fatigue that has worsened over last several weeks to point where pt unable to ambulate over last 1-2 days. Pt also endorsing difficultly swallowing/consequent anorexia in setting of right tongue lesion/pain that she first noticed last month. CT head + small vessel white matter ischemic changes. CT neck :5.8 x 2.5 x 2.3 cm mass on right tongue with lateral area of ulceration. [place] tonsil with hypoglossus and genioglossus muscle involvement. 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 Pt also endorsing Left sided weakness, MRI showing right posterior corona radiata acute vs subacute infarct.    Patient underwent a tongue biopsy by IR  Patient was complaining of weakness in L arm so an MRI head was obtained and found to have an Acute/Subacute right posterior corona radiata infarct. 65 yo f with h/o depression/anxiety, htn, hld, well controlled asthma with no exacerbation for many years.  Notes profound fatigue that has worsened over last several weeks to point where pt unable to ambulate over last 1-2 days. Pt also endorsing difficultly swallowing/consequent anorexia in setting of right tongue lesion/pain that she first noticed last month. CT head + small vessel white matter ischemic changes. CT neck :5.8 x 2.5 x 2.3 cm mass on right tongue with lateral area of ulceration. [place] tonsil with hypoglossus and genioglossus muscle involvement. 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 Pt also endorsing Left sided weakness, MRI showing right posterior corona radiata acute vs subacute infarct.    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 Oncology was consulted and determined that patient possibly needs chemotherapy and radiation, however given acute CVA, will hold off till  pt is discharged from rehab. Information for outpatient radiation and oncology follow up was given to patient and radiation setup was provided to [facility]. Patient is now cleared for discharge to rehab facility. 65 yo f with h/o depression/anxiety, htn, hld, well controlled asthma with no exacerbation for many years.  Notes profound fatigue that has worsened over last several weeks to point where pt unable to ambulate over last 1-2 days. Pt also endorsing difficultly swallowing/consequent anorexia in setting of right tongue lesion/pain that she first noticed last month. CT head + small vessel white matter ischemic changes. CT neck :5.8 x 2.5 x 2.3 cm mass on right tongue with lateral area of ulceration. [place] tonsil with hypoglossus and genioglossus muscle involvement. 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 Pt also endorsing Left sided weakness, MRI showing right posterior corona radiata acute vs subacute infarct.    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 Oncology was consulted and determined that patient possibly needs chemotherapy and radiation, however given acute CVA, will hold off till  pt is discharged from rehab. Information for outpatient radiation and oncology follow up was given to patient and radiation setup was provided to [facility]. Patient is now cleared for discharge to rehab facility.   Patient is medically optimized for acute rehab for CVA.  Once rehab is finished, she will assume care for management of the tongue cancer with radiation oncologist and oncologist at [facility].

## 2017-02-23 NOTE — DISCHARGE NOTE ADULT - OTHER SIGNIFICANT FINDINGS
Admit Diagnosis) Malignant neoplasm of tongue  (PMH) HTN (hypertension)  (PMH) HLD (hyperlipidemia)  (PMH) Depression  (PMH) Anxiety  (Principal DC/DX) Tongue cancer  (Problem/DX) Essential hypertension  (Problem/DX) Depression, unspecified depression type  (Problem/DX) Tongue cancer  (PSH) No significant past surgical history  (Secondary DC/DX) CVA (cerebral vascular accident)  (Secondary DC/DX) Depression, unspecified depression type  (Secondary DC/DX) Essential hypertension  (Secondary DC/DX) Ambulatory dysfunction (PMH) HTN (hypertension)  (PMH) HLD (hyperlipidemia)  (PMH) Depression  (PMH) Anxiety  (PSH) No significant past surgical history

## 2017-02-23 NOTE — DISCHARGE NOTE ADULT - MEDICATION SUMMARY - MEDICATIONS TO TAKE
I will START or STAY ON the medications listed below when I get home from the hospital:    aspirin 81 mg oral delayed release tablet  -- 1 tab(s) by mouth once a day  -- Indication: For CVA (cerebral vascular accident)    lisinopril 20 mg oral tablet  -- 1 tab(s) by mouth once a day  -- Indication: For HTN (hypertension)    imipramine 50 mg oral tablet  -- 2 tab(s) by mouth 2 times a day  -- Indication: For Depression    metFORMIN 500 mg oral tablet  -- 1 tab(s) by mouth 2 times a day  -- Indication: For Diabetes    atorvastatin 80 mg oral tablet  -- 1 tab(s) by mouth once a day (at bedtime)  -- Indication: For HLD (hyperlipidemia)    ARIPiprazole 20 mg oral tablet  -- 1 tab(s) by mouth once a day  -- Indication: For Depression    amLODIPine 10 mg oral tablet  -- 1 tab(s) by mouth once a day  -- Indication: For HTN (hypertension)

## 2017-02-23 NOTE — SWALLOW BEDSIDE ASSESSMENT ADULT - COMMENTS
Attempted to see patient for follow up swallow evaluation this am, however as per ADS team pt is NPO for additional testing at this time. ADS team requests swallow evaluation be reattempted tomorrow, 2/24/2017. Conversation with ADS held at [facility] #[id number]. This dept to f/u tomorrow 2/24/2017 for attempt at initial swallow evaluation.

## 2017-02-23 NOTE — DISCHARGE NOTE ADULT - NS AS DC STROKE ED MATERIALS
Stroke Education Booklet/Risk Factors for Stroke/Need for Followup After Discharge/Prescribed Medications/Stroke Warning Signs and Symptoms/Call 911 for Stroke

## 2017-02-24 LAB
BUN SERPL-MCNC: 8 MG/DL — SIGNIFICANT CHANGE UP (ref 7–23)
CALCIUM SERPL-MCNC: 9.1 MG/DL — SIGNIFICANT CHANGE UP (ref 8.4–10.5)
CHLORIDE SERPL-SCNC: 99 MMOL/L — SIGNIFICANT CHANGE UP (ref 98–107)
CO2 SERPL-SCNC: 24 MMOL/L — SIGNIFICANT CHANGE UP (ref 22–31)
CREAT SERPL-MCNC: 0.71 MG/DL — SIGNIFICANT CHANGE UP (ref 0.5–1.3)
GLUCOSE SERPL-MCNC: 128 MG/DL — HIGH (ref 70–99)
HCT VFR BLD CALC: 40.2 % — SIGNIFICANT CHANGE UP (ref 34.5–45)
HGB BLD-MCNC: 13.3 G/DL — SIGNIFICANT CHANGE UP (ref 11.5–15.5)
MCHC RBC-ENTMCNC: 29.4 PG — SIGNIFICANT CHANGE UP (ref 27–34)
MCHC RBC-ENTMCNC: 33.1 % — SIGNIFICANT CHANGE UP (ref 32–36)
MCV RBC AUTO: 88.7 FL — SIGNIFICANT CHANGE UP (ref 80–100)
PLATELET # BLD AUTO: 307 K/UL — SIGNIFICANT CHANGE UP (ref 150–400)
PMV BLD: 9.8 FL — SIGNIFICANT CHANGE UP (ref 7–13)
POTASSIUM SERPL-MCNC: 3.6 MMOL/L — SIGNIFICANT CHANGE UP (ref 3.5–5.3)
POTASSIUM SERPL-SCNC: 3.6 MMOL/L — SIGNIFICANT CHANGE UP (ref 3.5–5.3)
RBC # BLD: 4.53 M/UL — SIGNIFICANT CHANGE UP (ref 3.8–5.2)
RBC # FLD: 13.6 % — SIGNIFICANT CHANGE UP (ref 10.3–14.5)
SODIUM SERPL-SCNC: 139 MMOL/L — SIGNIFICANT CHANGE UP (ref 135–145)
WBC # BLD: 7.54 K/UL — SIGNIFICANT CHANGE UP (ref 3.8–10.5)
WBC # FLD AUTO: 7.54 K/UL — SIGNIFICANT CHANGE UP (ref 3.8–10.5)

## 2017-02-24 PROCEDURE — 88305 TISSUE EXAM BY PATHOLOGIST: CPT | Mod: 26

## 2017-02-24 PROCEDURE — 99233 SBSQ HOSP IP/OBS HIGH 50: CPT

## 2017-02-24 PROCEDURE — 88305 TISSUE EXAM BY PATHOLOGIST: CPT | Mod: 26,59

## 2017-02-24 PROCEDURE — 88367 INSITU HYBRIDIZATION AUTO: CPT | Mod: 26

## 2017-02-24 PROCEDURE — 88365 INSITU HYBRIDIZATION (FISH): CPT | Mod: 26,59

## 2017-02-24 PROCEDURE — 88342 IMHCHEM/IMCYTCHM 1ST ANTB: CPT | Mod: 26

## 2017-02-24 PROCEDURE — 99222 1ST HOSP IP/OBS MODERATE 55: CPT

## 2017-02-24 RX ORDER — ACETAMINOPHEN 500 MG
650 TABLET ORAL EVERY 6 HOURS
Qty: 0 | Refills: 0 | Status: DISCONTINUED | OUTPATIENT
Start: 2017-02-24 | End: 2017-03-06

## 2017-02-24 RX ADMIN — Medication 100 MILLIGRAM(S): at 10:55

## 2017-02-24 RX ADMIN — SODIUM CHLORIDE 75 MILLILITER(S): 9 INJECTION INTRAMUSCULAR; INTRAVENOUS; SUBCUTANEOUS at 12:17

## 2017-02-24 RX ADMIN — Medication 81 MILLIGRAM(S): at 14:52

## 2017-02-24 RX ADMIN — AMLODIPINE BESYLATE 10 MILLIGRAM(S): 2.5 TABLET ORAL at 06:08

## 2017-02-24 RX ADMIN — Medication 100 MILLIGRAM(S): at 22:54

## 2017-02-24 RX ADMIN — ATORVASTATIN CALCIUM 80 MILLIGRAM(S): 80 TABLET, FILM COATED ORAL at 22:55

## 2017-02-24 RX ADMIN — Medication 650 MILLIGRAM(S): at 18:06

## 2017-02-24 RX ADMIN — Medication 650 MILLIGRAM(S): at 17:34

## 2017-02-24 NOTE — SWALLOW BEDSIDE ASSESSMENT ADULT - SWALLOW EVAL: DIAGNOSIS
1. Mild oral dysphagia for puree and thin liquids marked by delayed oral transit and a-p transport time . 2. Moderate oral dysphagia for soft solids marked by prolonged mastication and delayed a-p transport time. In addition, moderate lingual residual noted , greater on the right, which was manually removed by the patient. 3. Mild pharyngeal dysphagia for puree, soft solids and thin liquids marked by delayed swallow trigger with reduced laryngeal elevation upon palpation  . No clinical evidence of airway penetration noted.

## 2017-02-24 NOTE — OCCUPATIONAL THERAPY INITIAL EVALUATION ADULT - RANGE OF MOTION EXAMINATION, UPPER EXTREMITY
Right UE Active ROM was WFL (within functional limits)/Left UE Active Assistive ROM was WFL  (within functional limits)

## 2017-02-24 NOTE — OCCUPATIONAL THERAPY INITIAL EVALUATION ADULT - ADDITIONAL COMMENTS
CT head + small vessel white matter ischemic changes. CT neck :5.8 x2.5 x2.3 cm mass on right tongue with lateral area of ulceration. [place] tonsil with hypoglossus and genioglossus muscle involvement. 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 Polypoid soft-tissue focus in left nasal cavity

## 2017-02-24 NOTE — SWALLOW BEDSIDE ASSESSMENT ADULT - COMMENTS
Patient seen at bedside at which time she was alert and cooperative. Patient was eating breakfast upon clinician's arrival. Upon questioning , patient reported that it take " a long time to eat" Patient seen at bedside at which time she was alert and cooperative. Patient was eating breakfast upon clinician's arrival. Upon questioning , patient reported that it take " a long time to eat" In addition, patient with mild dysarthria marked by imprecise articulatory precision during verbal discourse.   As per chart review:67 y/o Male with PMHx depression/anxiety, htn, hld, well controlled asthma with no exacerbation for many years presents with anorexia/weakness in setting of tongue mass.    +Tongue mass: endophytic mass with lymph nodes metastases- bx by IR postponed f/u ENT  +Acute/Subacute right posterior corona radiata infarct- confirmed by MRI, on ASA/Lipitor   tranfered to tele from ADS 2/23 for CVA

## 2017-02-24 NOTE — OCCUPATIONAL THERAPY INITIAL EVALUATION ADULT - PERTINENT HX OF CURRENT PROBLEM, REHAB EVAL
67 yo f with h/o depression/anxiety, htn, hld, well controlled asthma with no exacerbation for many years.  Notes profound fatigue that has worsened over last several weeks to point where pt unable to ambulate over last 1-2 days. Pt also endorsing difficultly swallowing/consequent anorexia in setting of right tongue lesion/pain that she first noticed last month??. (see below)

## 2017-02-24 NOTE — SWALLOW BEDSIDE ASSESSMENT ADULT - ADDITIONAL RECOMMENDATIONS
this department to follow for dysphagia therapy during this admission as schedule permits. patient would benefit from continued swallowing therapy following discharge pending medical  treatment plan. this department to follow for dysphagia therapy during this admission as schedule permits. patient would benefit from continued swallowing therapy following discharge pending medical  treatment plan.Consider formal speech / language evaluation .

## 2017-02-24 NOTE — SWALLOW BEDSIDE ASSESSMENT ADULT - SWALLOW EVAL: RECOMMENDED FEEDING/EATING TECHNIQUES
allow for swallow between intakes/small sips/bites/maintain upright posture during/after eating for 30 mins/crush medication (when feasible)/position upright (90 degrees)/oral hygiene/alternate food with liquid

## 2017-02-24 NOTE — OCCUPATIONAL THERAPY INITIAL EVALUATION ADULT - LIVES WITH, PROFILE
Pt lives with daughter. 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 Pt lives in a house with steps to manage.

## 2017-02-24 NOTE — SWALLOW BEDSIDE ASSESSMENT ADULT - ASR SWALLOW ASPIRATION MONITOR
pneumonia/change of breathing pattern/cough/throat clearing/gurgly voice/upper respiratory infection/oral hygiene/position upright (90Y)/fever

## 2017-02-24 NOTE — OCCUPATIONAL THERAPY INITIAL EVALUATION ADULT - PLANNED THERAPY INTERVENTIONS, OT EVAL
ADL retraining/balance training/motor coordination training/ROM/neuromuscular re-education/bed mobility training/fine motor coordination training/strengthening/transfer training

## 2017-02-24 NOTE — OCCUPATIONAL THERAPY INITIAL EVALUATION ADULT - DIAGNOSIS, OT EVAL
Left sided weakness, decrease postural control, slurred speech, decreased sitting/standing balance, decreased ADL independence, decreased functional mobility

## 2017-02-24 NOTE — PHYSICAL THERAPY INITIAL EVALUATION ADULT - ACTIVE RANGE OF MOTION EXAMINATION, REHAB EVAL
Right UE Active ROM was WFL (within functional limits)/L UE: pt unable to move/bilateral  lower extremity Active ROM was WFL (within functional limits)

## 2017-02-25 LAB
BUN SERPL-MCNC: 8 MG/DL — SIGNIFICANT CHANGE UP (ref 7–23)
CALCIUM SERPL-MCNC: 9.4 MG/DL — SIGNIFICANT CHANGE UP (ref 8.4–10.5)
CHLORIDE SERPL-SCNC: 99 MMOL/L — SIGNIFICANT CHANGE UP (ref 98–107)
CO2 SERPL-SCNC: 22 MMOL/L — SIGNIFICANT CHANGE UP (ref 22–31)
CREAT SERPL-MCNC: 0.63 MG/DL — SIGNIFICANT CHANGE UP (ref 0.5–1.3)
GLUCOSE SERPL-MCNC: 129 MG/DL — HIGH (ref 70–99)
HCT VFR BLD CALC: 40.3 % — SIGNIFICANT CHANGE UP (ref 34.5–45)
HGB BLD-MCNC: 13.3 G/DL — SIGNIFICANT CHANGE UP (ref 11.5–15.5)
MAGNESIUM SERPL-MCNC: 1.8 MG/DL — SIGNIFICANT CHANGE UP (ref 1.6–2.6)
MCHC RBC-ENTMCNC: 28.8 PG — SIGNIFICANT CHANGE UP (ref 27–34)
MCHC RBC-ENTMCNC: 33 % — SIGNIFICANT CHANGE UP (ref 32–36)
MCV RBC AUTO: 87.2 FL — SIGNIFICANT CHANGE UP (ref 80–100)
PHOSPHATE SERPL-MCNC: 3.8 MG/DL — SIGNIFICANT CHANGE UP (ref 2.5–4.5)
PLATELET # BLD AUTO: 308 K/UL — SIGNIFICANT CHANGE UP (ref 150–400)
PMV BLD: 9.4 FL — SIGNIFICANT CHANGE UP (ref 7–13)
POTASSIUM SERPL-MCNC: 3.5 MMOL/L — SIGNIFICANT CHANGE UP (ref 3.5–5.3)
POTASSIUM SERPL-SCNC: 3.5 MMOL/L — SIGNIFICANT CHANGE UP (ref 3.5–5.3)
RBC # BLD: 4.62 M/UL — SIGNIFICANT CHANGE UP (ref 3.8–5.2)
RBC # FLD: 13.5 % — SIGNIFICANT CHANGE UP (ref 10.3–14.5)
SODIUM SERPL-SCNC: 138 MMOL/L — SIGNIFICANT CHANGE UP (ref 135–145)
WBC # BLD: 9.2 K/UL — SIGNIFICANT CHANGE UP (ref 3.8–10.5)
WBC # FLD AUTO: 9.2 K/UL — SIGNIFICANT CHANGE UP (ref 3.8–10.5)

## 2017-02-25 PROCEDURE — 99233 SBSQ HOSP IP/OBS HIGH 50: CPT

## 2017-02-25 RX ORDER — LISINOPRIL 2.5 MG/1
5 TABLET ORAL ONCE
Qty: 0 | Refills: 0 | Status: COMPLETED | OUTPATIENT
Start: 2017-02-25 | End: 2017-02-25

## 2017-02-25 RX ORDER — LISINOPRIL 2.5 MG/1
5 TABLET ORAL DAILY
Qty: 0 | Refills: 0 | Status: DISCONTINUED | OUTPATIENT
Start: 2017-02-25 | End: 2017-02-26

## 2017-02-25 RX ADMIN — LISINOPRIL 5 MILLIGRAM(S): 2.5 TABLET ORAL at 20:57

## 2017-02-25 RX ADMIN — ATORVASTATIN CALCIUM 80 MILLIGRAM(S): 80 TABLET, FILM COATED ORAL at 12:38

## 2017-02-25 RX ADMIN — Medication 81 MILLIGRAM(S): at 12:39

## 2017-02-25 RX ADMIN — Medication 100 MILLIGRAM(S): at 17:48

## 2017-02-25 RX ADMIN — Medication 100 MILLIGRAM(S): at 06:23

## 2017-02-25 RX ADMIN — AMLODIPINE BESYLATE 10 MILLIGRAM(S): 2.5 TABLET ORAL at 06:24

## 2017-02-25 RX ADMIN — ARIPIPRAZOLE 20 MILLIGRAM(S): 15 TABLET ORAL at 12:37

## 2017-02-25 RX ADMIN — SODIUM CHLORIDE 75 MILLILITER(S): 9 INJECTION INTRAMUSCULAR; INTRAVENOUS; SUBCUTANEOUS at 07:01

## 2017-02-25 RX ADMIN — LISINOPRIL 5 MILLIGRAM(S): 2.5 TABLET ORAL at 18:21

## 2017-02-26 PROCEDURE — 93306 TTE W/DOPPLER COMPLETE: CPT | Mod: 26

## 2017-02-26 PROCEDURE — 99233 SBSQ HOSP IP/OBS HIGH 50: CPT

## 2017-02-26 RX ORDER — LISINOPRIL 2.5 MG/1
10 TABLET ORAL DAILY
Qty: 0 | Refills: 0 | Status: DISCONTINUED | OUTPATIENT
Start: 2017-02-26 | End: 2017-02-27

## 2017-02-26 RX ORDER — ATORVASTATIN CALCIUM 80 MG/1
80 TABLET, FILM COATED ORAL AT BEDTIME
Qty: 0 | Refills: 0 | Status: DISCONTINUED | OUTPATIENT
Start: 2017-02-26 | End: 2017-03-06

## 2017-02-26 RX ORDER — LISINOPRIL 2.5 MG/1
5 TABLET ORAL ONCE
Qty: 0 | Refills: 0 | Status: COMPLETED | OUTPATIENT
Start: 2017-02-26 | End: 2017-02-26

## 2017-02-26 RX ADMIN — LISINOPRIL 5 MILLIGRAM(S): 2.5 TABLET ORAL at 16:02

## 2017-02-26 RX ADMIN — Medication 81 MILLIGRAM(S): at 17:12

## 2017-02-26 RX ADMIN — ARIPIPRAZOLE 20 MILLIGRAM(S): 15 TABLET ORAL at 17:11

## 2017-02-26 RX ADMIN — ATORVASTATIN CALCIUM 80 MILLIGRAM(S): 80 TABLET, FILM COATED ORAL at 21:22

## 2017-02-26 RX ADMIN — Medication 100 MILLIGRAM(S): at 21:22

## 2017-02-26 RX ADMIN — AMLODIPINE BESYLATE 10 MILLIGRAM(S): 2.5 TABLET ORAL at 06:53

## 2017-02-26 RX ADMIN — Medication 100 MILLIGRAM(S): at 06:53

## 2017-02-26 RX ADMIN — LISINOPRIL 5 MILLIGRAM(S): 2.5 TABLET ORAL at 06:52

## 2017-02-27 LAB
BASOPHILS # BLD AUTO: 0.01 K/UL — SIGNIFICANT CHANGE UP (ref 0–0.2)
BASOPHILS NFR BLD AUTO: 0.1 % — SIGNIFICANT CHANGE UP (ref 0–2)
BUN SERPL-MCNC: 5 MG/DL — LOW (ref 7–23)
CALCIUM SERPL-MCNC: 9.4 MG/DL — SIGNIFICANT CHANGE UP (ref 8.4–10.5)
CHLORIDE SERPL-SCNC: 96 MMOL/L — LOW (ref 98–107)
CO2 SERPL-SCNC: 28 MMOL/L — SIGNIFICANT CHANGE UP (ref 22–31)
CREAT SERPL-MCNC: 0.6 MG/DL — SIGNIFICANT CHANGE UP (ref 0.5–1.3)
EOSINOPHIL # BLD AUTO: 0.25 K/UL — SIGNIFICANT CHANGE UP (ref 0–0.5)
EOSINOPHIL NFR BLD AUTO: 2.6 % — SIGNIFICANT CHANGE UP (ref 0–6)
GLUCOSE SERPL-MCNC: 125 MG/DL — HIGH (ref 70–99)
HCT VFR BLD CALC: 39.5 % — SIGNIFICANT CHANGE UP (ref 34.5–45)
HGB BLD-MCNC: 13.1 G/DL — SIGNIFICANT CHANGE UP (ref 11.5–15.5)
IMM GRANULOCYTES NFR BLD AUTO: 0.2 % — SIGNIFICANT CHANGE UP (ref 0–1.5)
LYMPHOCYTES # BLD AUTO: 1.86 K/UL — SIGNIFICANT CHANGE UP (ref 1–3.3)
LYMPHOCYTES # BLD AUTO: 19.5 % — SIGNIFICANT CHANGE UP (ref 13–44)
MAGNESIUM SERPL-MCNC: 1.7 MG/DL — SIGNIFICANT CHANGE UP (ref 1.6–2.6)
MCHC RBC-ENTMCNC: 29.2 PG — SIGNIFICANT CHANGE UP (ref 27–34)
MCHC RBC-ENTMCNC: 33.2 % — SIGNIFICANT CHANGE UP (ref 32–36)
MCV RBC AUTO: 88.2 FL — SIGNIFICANT CHANGE UP (ref 80–100)
MONOCYTES # BLD AUTO: 0.67 K/UL — SIGNIFICANT CHANGE UP (ref 0–0.9)
MONOCYTES NFR BLD AUTO: 7 % — SIGNIFICANT CHANGE UP (ref 2–14)
NEUTROPHILS # BLD AUTO: 6.75 K/UL — SIGNIFICANT CHANGE UP (ref 1.8–7.4)
NEUTROPHILS NFR BLD AUTO: 70.6 % — SIGNIFICANT CHANGE UP (ref 43–77)
PLATELET # BLD AUTO: 342 K/UL — SIGNIFICANT CHANGE UP (ref 150–400)
PMV BLD: 10.2 FL — SIGNIFICANT CHANGE UP (ref 7–13)
POTASSIUM SERPL-MCNC: 3.4 MMOL/L — LOW (ref 3.5–5.3)
POTASSIUM SERPL-SCNC: 3.4 MMOL/L — LOW (ref 3.5–5.3)
RBC # BLD: 4.48 M/UL — SIGNIFICANT CHANGE UP (ref 3.8–5.2)
RBC # FLD: 13.6 % — SIGNIFICANT CHANGE UP (ref 10.3–14.5)
SODIUM SERPL-SCNC: 137 MMOL/L — SIGNIFICANT CHANGE UP (ref 135–145)
WBC # BLD: 9.56 K/UL — SIGNIFICANT CHANGE UP (ref 3.8–10.5)
WBC # FLD AUTO: 9.56 K/UL — SIGNIFICANT CHANGE UP (ref 3.8–10.5)

## 2017-02-27 PROCEDURE — 71260 CT THORAX DX C+: CPT | Mod: 26

## 2017-02-27 PROCEDURE — 99233 SBSQ HOSP IP/OBS HIGH 50: CPT | Mod: GC

## 2017-02-27 PROCEDURE — 99233 SBSQ HOSP IP/OBS HIGH 50: CPT

## 2017-02-27 PROCEDURE — 74177 CT ABD & PELVIS W/CONTRAST: CPT | Mod: 26

## 2017-02-27 RX ORDER — POTASSIUM CHLORIDE 20 MEQ
20 PACKET (EA) ORAL ONCE
Qty: 0 | Refills: 0 | Status: DISCONTINUED | OUTPATIENT
Start: 2017-02-27 | End: 2017-02-27

## 2017-02-27 RX ORDER — POTASSIUM CHLORIDE 20 MEQ
40 PACKET (EA) ORAL ONCE
Qty: 0 | Refills: 0 | Status: DISCONTINUED | OUTPATIENT
Start: 2017-02-27 | End: 2017-02-27

## 2017-02-27 RX ORDER — MAGNESIUM OXIDE 400 MG ORAL TABLET 241.3 MG
400 TABLET ORAL
Qty: 0 | Refills: 0 | Status: COMPLETED | OUTPATIENT
Start: 2017-02-27 | End: 2017-03-01

## 2017-02-27 RX ORDER — LISINOPRIL 2.5 MG/1
20 TABLET ORAL DAILY
Qty: 0 | Refills: 0 | Status: DISCONTINUED | OUTPATIENT
Start: 2017-02-27 | End: 2017-03-06

## 2017-02-27 RX ORDER — POTASSIUM CHLORIDE 20 MEQ
10 PACKET (EA) ORAL
Qty: 0 | Refills: 0 | Status: COMPLETED | OUTPATIENT
Start: 2017-02-27 | End: 2017-02-27

## 2017-02-27 RX ORDER — LISINOPRIL 2.5 MG/1
10 TABLET ORAL ONCE
Qty: 0 | Refills: 0 | Status: COMPLETED | OUTPATIENT
Start: 2017-02-27 | End: 2017-02-27

## 2017-02-27 RX ORDER — POTASSIUM CHLORIDE 20 MEQ
30 PACKET (EA) ORAL EVERY 4 HOURS
Qty: 0 | Refills: 0 | Status: DISCONTINUED | OUTPATIENT
Start: 2017-02-27 | End: 2017-02-27

## 2017-02-27 RX ADMIN — Medication 100 MILLIGRAM(S): at 21:09

## 2017-02-27 RX ADMIN — LISINOPRIL 10 MILLIGRAM(S): 2.5 TABLET ORAL at 06:08

## 2017-02-27 RX ADMIN — Medication 30 MILLIEQUIVALENT(S): at 17:06

## 2017-02-27 RX ADMIN — Medication 81 MILLIGRAM(S): at 11:17

## 2017-02-27 RX ADMIN — MAGNESIUM OXIDE 400 MG ORAL TABLET 400 MILLIGRAM(S): 241.3 TABLET ORAL at 17:08

## 2017-02-27 RX ADMIN — AMLODIPINE BESYLATE 10 MILLIGRAM(S): 2.5 TABLET ORAL at 06:09

## 2017-02-27 RX ADMIN — LISINOPRIL 10 MILLIGRAM(S): 2.5 TABLET ORAL at 15:05

## 2017-02-27 RX ADMIN — Medication 100 MILLIEQUIVALENT(S): at 21:09

## 2017-02-27 RX ADMIN — Medication 100 MILLIGRAM(S): at 06:09

## 2017-02-27 RX ADMIN — Medication 100 MILLIEQUIVALENT(S): at 22:57

## 2017-02-27 NOTE — DIETITIAN INITIAL EVALUATION ADULT. - PROBLEM SELECTOR PLAN 1
-seen by ent who are recommending IR guided FNA  -spoke with IR fellow this evening, and advised to call at 8am tomorrow for formal consult request [id number]. Recommend npo pot-midnight in event that pt is scheduled tomorrow.  PLEASE ENSURE IR AWARE OF SUSPICIOUS FINDING IN LEFT NASAL CAVITY REPORTED ON CT  -bedside swallow eval ordered

## 2017-02-27 NOTE — DIETITIAN INITIAL EVALUATION ADULT. - PROBLEM SELECTOR PLAN 3
pt with h/o htn, pravin also endorsing active  anxiety; has not taken htn med since  PMD retired 2 years ago; will initiate norvasc for now especially in light of importance to adequately hydrate via IVF  -a1c, lipid panel ordered for am  -receptive to establishing care with a PCP once again

## 2017-02-27 NOTE — DIETITIAN INITIAL EVALUATION ADULT. - OTHER INFO
Nutrition assessment for LOS. Pt was admitted with Dx Malignant Neoplasm of Tongue. Currently on Pureed diet and reports to completing 25% of meals.  Denies swallowing difficulties or any nausea, vomiting, diarrhea, constipation. Noted SLP evaluation could not be completed on 2/23. Would suggest another swallow evaluation. Pt offered and agreeable to adding Supplement Glucerna Shake 3x daily. Discussed importance od adequate po intakes. Noted admit weight was 200# and Registered Dietitian obtained current weight on bed scale weight at 182#. Question accuracy of weight. Some weight loss since admit is likely considering current dx/po intakes. Nutrition assessment for LOS. Pt was admitted with Dx Malignant Neoplasm of Tongue. Currently on Pureed diet and reports to completing 25% of meals.  Denies swallowing difficulties or any nausea, vomiting, diarrhea, constipation. Noted SLP evaluation completed on 2/24-with rec for Pureed diet. Pt offered and agreeable to adding Supplement Glucerna Shake 3x daily. Discussed importance od adequate po intakes. Noted admit weight was 200# and Registered Dietitian obtained current weight on bed scale weight at 182#. Question accuracy of weight. Some weight loss since admit is likely considering current dx/po intakes.

## 2017-02-28 LAB
BUN SERPL-MCNC: 6 MG/DL — LOW (ref 7–23)
CALCIUM SERPL-MCNC: 9.6 MG/DL — SIGNIFICANT CHANGE UP (ref 8.4–10.5)
CHLORIDE SERPL-SCNC: 95 MMOL/L — LOW (ref 98–107)
CO2 SERPL-SCNC: 26 MMOL/L — SIGNIFICANT CHANGE UP (ref 22–31)
CREAT SERPL-MCNC: 0.62 MG/DL — SIGNIFICANT CHANGE UP (ref 0.5–1.3)
GLUCOSE SERPL-MCNC: 117 MG/DL — HIGH (ref 70–99)
MAGNESIUM SERPL-MCNC: 1.9 MG/DL — SIGNIFICANT CHANGE UP (ref 1.6–2.6)
POTASSIUM SERPL-MCNC: 3.6 MMOL/L — SIGNIFICANT CHANGE UP (ref 3.5–5.3)
POTASSIUM SERPL-SCNC: 3.6 MMOL/L — SIGNIFICANT CHANGE UP (ref 3.5–5.3)
SODIUM SERPL-SCNC: 139 MMOL/L — SIGNIFICANT CHANGE UP (ref 135–145)

## 2017-02-28 PROCEDURE — 99233 SBSQ HOSP IP/OBS HIGH 50: CPT

## 2017-02-28 PROCEDURE — 99232 SBSQ HOSP IP/OBS MODERATE 35: CPT | Mod: GC

## 2017-02-28 RX ADMIN — Medication 100 MILLIGRAM(S): at 19:41

## 2017-02-28 RX ADMIN — MAGNESIUM OXIDE 400 MG ORAL TABLET 400 MILLIGRAM(S): 241.3 TABLET ORAL at 09:00

## 2017-02-28 RX ADMIN — LISINOPRIL 20 MILLIGRAM(S): 2.5 TABLET ORAL at 07:17

## 2017-02-28 RX ADMIN — AMLODIPINE BESYLATE 10 MILLIGRAM(S): 2.5 TABLET ORAL at 07:17

## 2017-02-28 RX ADMIN — ATORVASTATIN CALCIUM 80 MILLIGRAM(S): 80 TABLET, FILM COATED ORAL at 21:20

## 2017-02-28 RX ADMIN — MAGNESIUM OXIDE 400 MG ORAL TABLET 400 MILLIGRAM(S): 241.3 TABLET ORAL at 17:09

## 2017-02-28 RX ADMIN — Medication 100 MILLIGRAM(S): at 07:17

## 2017-02-28 RX ADMIN — Medication 81 MILLIGRAM(S): at 12:44

## 2017-03-01 LAB
BASOPHILS # BLD AUTO: 0.02 K/UL — SIGNIFICANT CHANGE UP (ref 0–0.2)
BASOPHILS NFR BLD AUTO: 0.2 % — SIGNIFICANT CHANGE UP (ref 0–2)
BUN SERPL-MCNC: 10 MG/DL — SIGNIFICANT CHANGE UP (ref 7–23)
CALCIUM SERPL-MCNC: 9.6 MG/DL — SIGNIFICANT CHANGE UP (ref 8.4–10.5)
CHLORIDE SERPL-SCNC: 96 MMOL/L — LOW (ref 98–107)
CO2 SERPL-SCNC: 27 MMOL/L — SIGNIFICANT CHANGE UP (ref 22–31)
CREAT SERPL-MCNC: 0.63 MG/DL — SIGNIFICANT CHANGE UP (ref 0.5–1.3)
EOSINOPHIL # BLD AUTO: 0.29 K/UL — SIGNIFICANT CHANGE UP (ref 0–0.5)
EOSINOPHIL NFR BLD AUTO: 3.6 % — SIGNIFICANT CHANGE UP (ref 0–6)
GLUCOSE SERPL-MCNC: 120 MG/DL — HIGH (ref 70–99)
HCT VFR BLD CALC: 39.2 % — SIGNIFICANT CHANGE UP (ref 34.5–45)
HGB BLD-MCNC: 12.8 G/DL — SIGNIFICANT CHANGE UP (ref 11.5–15.5)
IMM GRANULOCYTES NFR BLD AUTO: 0.1 % — SIGNIFICANT CHANGE UP (ref 0–1.5)
LYMPHOCYTES # BLD AUTO: 1.84 K/UL — SIGNIFICANT CHANGE UP (ref 1–3.3)
LYMPHOCYTES # BLD AUTO: 22.5 % — SIGNIFICANT CHANGE UP (ref 13–44)
MAGNESIUM SERPL-MCNC: 1.9 MG/DL — SIGNIFICANT CHANGE UP (ref 1.6–2.6)
MCHC RBC-ENTMCNC: 29 PG — SIGNIFICANT CHANGE UP (ref 27–34)
MCHC RBC-ENTMCNC: 32.7 % — SIGNIFICANT CHANGE UP (ref 32–36)
MCV RBC AUTO: 88.9 FL — SIGNIFICANT CHANGE UP (ref 80–100)
MONOCYTES # BLD AUTO: 0.67 K/UL — SIGNIFICANT CHANGE UP (ref 0–0.9)
MONOCYTES NFR BLD AUTO: 8.2 % — SIGNIFICANT CHANGE UP (ref 2–14)
NEUTROPHILS # BLD AUTO: 5.33 K/UL — SIGNIFICANT CHANGE UP (ref 1.8–7.4)
NEUTROPHILS NFR BLD AUTO: 65.4 % — SIGNIFICANT CHANGE UP (ref 43–77)
PLATELET # BLD AUTO: 339 K/UL — SIGNIFICANT CHANGE UP (ref 150–400)
PMV BLD: 10 FL — SIGNIFICANT CHANGE UP (ref 7–13)
POTASSIUM SERPL-MCNC: 3.6 MMOL/L — SIGNIFICANT CHANGE UP (ref 3.5–5.3)
POTASSIUM SERPL-SCNC: 3.6 MMOL/L — SIGNIFICANT CHANGE UP (ref 3.5–5.3)
RBC # BLD: 4.41 M/UL — SIGNIFICANT CHANGE UP (ref 3.8–5.2)
RBC # FLD: 13.7 % — SIGNIFICANT CHANGE UP (ref 10.3–14.5)
SODIUM SERPL-SCNC: 138 MMOL/L — SIGNIFICANT CHANGE UP (ref 135–145)
WBC # BLD: 8.16 K/UL — SIGNIFICANT CHANGE UP (ref 3.8–10.5)
WBC # FLD AUTO: 8.16 K/UL — SIGNIFICANT CHANGE UP (ref 3.8–10.5)

## 2017-03-01 PROCEDURE — 99232 SBSQ HOSP IP/OBS MODERATE 35: CPT

## 2017-03-01 RX ORDER — AMLODIPINE BESYLATE 2.5 MG/1
1 TABLET ORAL
Qty: 0 | Refills: 0 | COMMUNITY
Start: 2017-03-01

## 2017-03-01 RX ORDER — METFORMIN HYDROCHLORIDE 850 MG/1
1 TABLET ORAL
Qty: 0 | Refills: 0 | COMMUNITY

## 2017-03-01 RX ORDER — ASPIRIN/CALCIUM CARB/MAGNESIUM 324 MG
1 TABLET ORAL
Qty: 0 | Refills: 0 | COMMUNITY
Start: 2017-03-01

## 2017-03-01 RX ORDER — LISINOPRIL 2.5 MG/1
1 TABLET ORAL
Qty: 0 | Refills: 0 | COMMUNITY
Start: 2017-03-01

## 2017-03-01 RX ORDER — ATORVASTATIN CALCIUM 80 MG/1
1 TABLET, FILM COATED ORAL
Qty: 0 | Refills: 0 | COMMUNITY
Start: 2017-03-01

## 2017-03-01 RX ADMIN — Medication 100 MILLIGRAM(S): at 14:11

## 2017-03-01 RX ADMIN — LISINOPRIL 20 MILLIGRAM(S): 2.5 TABLET ORAL at 14:11

## 2017-03-01 RX ADMIN — ARIPIPRAZOLE 20 MILLIGRAM(S): 15 TABLET ORAL at 14:11

## 2017-03-01 RX ADMIN — Medication 81 MILLIGRAM(S): at 14:11

## 2017-03-01 RX ADMIN — MAGNESIUM OXIDE 400 MG ORAL TABLET 400 MILLIGRAM(S): 241.3 TABLET ORAL at 14:11

## 2017-03-01 RX ADMIN — AMLODIPINE BESYLATE 10 MILLIGRAM(S): 2.5 TABLET ORAL at 14:11

## 2017-03-01 RX ADMIN — ATORVASTATIN CALCIUM 80 MILLIGRAM(S): 80 TABLET, FILM COATED ORAL at 21:28

## 2017-03-01 RX ADMIN — Medication 100 MILLIGRAM(S): at 21:28

## 2017-03-02 LAB
BASOPHILS # BLD AUTO: 0.03 K/UL — SIGNIFICANT CHANGE UP (ref 0–0.2)
BASOPHILS NFR BLD AUTO: 0.3 % — SIGNIFICANT CHANGE UP (ref 0–2)
BUN SERPL-MCNC: 12 MG/DL — SIGNIFICANT CHANGE UP (ref 7–23)
CALCIUM SERPL-MCNC: 9.7 MG/DL — SIGNIFICANT CHANGE UP (ref 8.4–10.5)
CHLORIDE SERPL-SCNC: 95 MMOL/L — LOW (ref 98–107)
CO2 SERPL-SCNC: 27 MMOL/L — SIGNIFICANT CHANGE UP (ref 22–31)
CREAT SERPL-MCNC: 0.77 MG/DL — SIGNIFICANT CHANGE UP (ref 0.5–1.3)
EOSINOPHIL # BLD AUTO: 0.4 K/UL — SIGNIFICANT CHANGE UP (ref 0–0.5)
EOSINOPHIL NFR BLD AUTO: 4.1 % — SIGNIFICANT CHANGE UP (ref 0–6)
GLUCOSE SERPL-MCNC: 121 MG/DL — HIGH (ref 70–99)
HCT VFR BLD CALC: 42.6 % — SIGNIFICANT CHANGE UP (ref 34.5–45)
HGB BLD-MCNC: 13.9 G/DL — SIGNIFICANT CHANGE UP (ref 11.5–15.5)
IMM GRANULOCYTES NFR BLD AUTO: 0.3 % — SIGNIFICANT CHANGE UP (ref 0–1.5)
LYMPHOCYTES # BLD AUTO: 2.23 K/UL — SIGNIFICANT CHANGE UP (ref 1–3.3)
LYMPHOCYTES # BLD AUTO: 23.1 % — SIGNIFICANT CHANGE UP (ref 13–44)
MAGNESIUM SERPL-MCNC: 2 MG/DL — SIGNIFICANT CHANGE UP (ref 1.6–2.6)
MCHC RBC-ENTMCNC: 29.1 PG — SIGNIFICANT CHANGE UP (ref 27–34)
MCHC RBC-ENTMCNC: 32.6 % — SIGNIFICANT CHANGE UP (ref 32–36)
MCV RBC AUTO: 89.3 FL — SIGNIFICANT CHANGE UP (ref 80–100)
MONOCYTES # BLD AUTO: 0.81 K/UL — SIGNIFICANT CHANGE UP (ref 0–0.9)
MONOCYTES NFR BLD AUTO: 8.4 % — SIGNIFICANT CHANGE UP (ref 2–14)
NEUTROPHILS # BLD AUTO: 6.15 K/UL — SIGNIFICANT CHANGE UP (ref 1.8–7.4)
NEUTROPHILS NFR BLD AUTO: 63.8 % — SIGNIFICANT CHANGE UP (ref 43–77)
PLATELET # BLD AUTO: 385 K/UL — SIGNIFICANT CHANGE UP (ref 150–400)
PMV BLD: 10.6 FL — SIGNIFICANT CHANGE UP (ref 7–13)
POTASSIUM SERPL-MCNC: 3.6 MMOL/L — SIGNIFICANT CHANGE UP (ref 3.5–5.3)
POTASSIUM SERPL-SCNC: 3.6 MMOL/L — SIGNIFICANT CHANGE UP (ref 3.5–5.3)
RBC # BLD: 4.77 M/UL — SIGNIFICANT CHANGE UP (ref 3.8–5.2)
RBC # FLD: 14 % — SIGNIFICANT CHANGE UP (ref 10.3–14.5)
SODIUM SERPL-SCNC: 138 MMOL/L — SIGNIFICANT CHANGE UP (ref 135–145)
WBC # BLD: 9.65 K/UL — SIGNIFICANT CHANGE UP (ref 3.8–10.5)
WBC # FLD AUTO: 9.65 K/UL — SIGNIFICANT CHANGE UP (ref 3.8–10.5)

## 2017-03-02 PROCEDURE — 99232 SBSQ HOSP IP/OBS MODERATE 35: CPT

## 2017-03-02 RX ADMIN — Medication 100 MILLIGRAM(S): at 19:00

## 2017-03-02 RX ADMIN — LISINOPRIL 20 MILLIGRAM(S): 2.5 TABLET ORAL at 06:38

## 2017-03-02 RX ADMIN — Medication 81 MILLIGRAM(S): at 12:30

## 2017-03-02 RX ADMIN — Medication 100 MILLIGRAM(S): at 06:38

## 2017-03-02 RX ADMIN — ATORVASTATIN CALCIUM 80 MILLIGRAM(S): 80 TABLET, FILM COATED ORAL at 23:54

## 2017-03-02 RX ADMIN — AMLODIPINE BESYLATE 10 MILLIGRAM(S): 2.5 TABLET ORAL at 06:38

## 2017-03-03 LAB
BUN SERPL-MCNC: 12 MG/DL — SIGNIFICANT CHANGE UP (ref 7–23)
CALCIUM SERPL-MCNC: 9.4 MG/DL — SIGNIFICANT CHANGE UP (ref 8.4–10.5)
CHLORIDE SERPL-SCNC: 96 MMOL/L — LOW (ref 98–107)
CO2 SERPL-SCNC: 27 MMOL/L — SIGNIFICANT CHANGE UP (ref 22–31)
CREAT SERPL-MCNC: 0.79 MG/DL — SIGNIFICANT CHANGE UP (ref 0.5–1.3)
GLUCOSE SERPL-MCNC: 123 MG/DL — HIGH (ref 70–99)
HCT VFR BLD CALC: 40.9 % — SIGNIFICANT CHANGE UP (ref 34.5–45)
HGB BLD-MCNC: 13.4 G/DL — SIGNIFICANT CHANGE UP (ref 11.5–15.5)
MAGNESIUM SERPL-MCNC: 1.9 MG/DL — SIGNIFICANT CHANGE UP (ref 1.6–2.6)
MCHC RBC-ENTMCNC: 29.3 PG — SIGNIFICANT CHANGE UP (ref 27–34)
MCHC RBC-ENTMCNC: 32.8 % — SIGNIFICANT CHANGE UP (ref 32–36)
MCV RBC AUTO: 89.3 FL — SIGNIFICANT CHANGE UP (ref 80–100)
PHOSPHATE SERPL-MCNC: 3.3 MG/DL — SIGNIFICANT CHANGE UP (ref 2.5–4.5)
PLATELET # BLD AUTO: 377 K/UL — SIGNIFICANT CHANGE UP (ref 150–400)
PMV BLD: 9.9 FL — SIGNIFICANT CHANGE UP (ref 7–13)
POTASSIUM SERPL-MCNC: 3.5 MMOL/L — SIGNIFICANT CHANGE UP (ref 3.5–5.3)
POTASSIUM SERPL-SCNC: 3.5 MMOL/L — SIGNIFICANT CHANGE UP (ref 3.5–5.3)
RBC # BLD: 4.58 M/UL — SIGNIFICANT CHANGE UP (ref 3.8–5.2)
RBC # FLD: 13.8 % — SIGNIFICANT CHANGE UP (ref 10.3–14.5)
SODIUM SERPL-SCNC: 138 MMOL/L — SIGNIFICANT CHANGE UP (ref 135–145)
WBC # BLD: 8.63 K/UL — SIGNIFICANT CHANGE UP (ref 3.8–10.5)
WBC # FLD AUTO: 8.63 K/UL — SIGNIFICANT CHANGE UP (ref 3.8–10.5)

## 2017-03-03 PROCEDURE — 99232 SBSQ HOSP IP/OBS MODERATE 35: CPT

## 2017-03-03 RX ADMIN — AMLODIPINE BESYLATE 10 MILLIGRAM(S): 2.5 TABLET ORAL at 06:58

## 2017-03-03 RX ADMIN — ATORVASTATIN CALCIUM 80 MILLIGRAM(S): 80 TABLET, FILM COATED ORAL at 22:56

## 2017-03-03 RX ADMIN — Medication 100 MILLIGRAM(S): at 22:56

## 2017-03-03 RX ADMIN — Medication 81 MILLIGRAM(S): at 11:38

## 2017-03-03 RX ADMIN — Medication 100 MILLIGRAM(S): at 06:58

## 2017-03-03 RX ADMIN — LISINOPRIL 20 MILLIGRAM(S): 2.5 TABLET ORAL at 06:59

## 2017-03-04 LAB
BASOPHILS # BLD AUTO: 0.01 K/UL — SIGNIFICANT CHANGE UP (ref 0–0.2)
BASOPHILS NFR BLD AUTO: 0.1 % — SIGNIFICANT CHANGE UP (ref 0–2)
BUN SERPL-MCNC: 15 MG/DL — SIGNIFICANT CHANGE UP (ref 7–23)
CALCIUM SERPL-MCNC: 9.8 MG/DL — SIGNIFICANT CHANGE UP (ref 8.4–10.5)
CHLORIDE SERPL-SCNC: 94 MMOL/L — LOW (ref 98–107)
CO2 SERPL-SCNC: 24 MMOL/L — SIGNIFICANT CHANGE UP (ref 22–31)
CREAT SERPL-MCNC: 0.77 MG/DL — SIGNIFICANT CHANGE UP (ref 0.5–1.3)
EOSINOPHIL # BLD AUTO: 0.27 K/UL — SIGNIFICANT CHANGE UP (ref 0–0.5)
EOSINOPHIL NFR BLD AUTO: 2.7 % — SIGNIFICANT CHANGE UP (ref 0–6)
GLUCOSE SERPL-MCNC: 114 MG/DL — HIGH (ref 70–99)
HCT VFR BLD CALC: 43 % — SIGNIFICANT CHANGE UP (ref 34.5–45)
HGB BLD-MCNC: 14 G/DL — SIGNIFICANT CHANGE UP (ref 11.5–15.5)
IMM GRANULOCYTES NFR BLD AUTO: 0.2 % — SIGNIFICANT CHANGE UP (ref 0–1.5)
LYMPHOCYTES # BLD AUTO: 2.45 K/UL — SIGNIFICANT CHANGE UP (ref 1–3.3)
LYMPHOCYTES # BLD AUTO: 24.2 % — SIGNIFICANT CHANGE UP (ref 13–44)
MAGNESIUM SERPL-MCNC: 2 MG/DL — SIGNIFICANT CHANGE UP (ref 1.6–2.6)
MCHC RBC-ENTMCNC: 29 PG — SIGNIFICANT CHANGE UP (ref 27–34)
MCHC RBC-ENTMCNC: 32.6 % — SIGNIFICANT CHANGE UP (ref 32–36)
MCV RBC AUTO: 89 FL — SIGNIFICANT CHANGE UP (ref 80–100)
MONOCYTES # BLD AUTO: 0.79 K/UL — SIGNIFICANT CHANGE UP (ref 0–0.9)
MONOCYTES NFR BLD AUTO: 7.8 % — SIGNIFICANT CHANGE UP (ref 2–14)
NEUTROPHILS # BLD AUTO: 6.6 K/UL — SIGNIFICANT CHANGE UP (ref 1.8–7.4)
NEUTROPHILS NFR BLD AUTO: 65 % — SIGNIFICANT CHANGE UP (ref 43–77)
PLATELET # BLD AUTO: 437 K/UL — HIGH (ref 150–400)
PMV BLD: 10.1 FL — SIGNIFICANT CHANGE UP (ref 7–13)
POTASSIUM SERPL-MCNC: 3.7 MMOL/L — SIGNIFICANT CHANGE UP (ref 3.5–5.3)
POTASSIUM SERPL-SCNC: 3.7 MMOL/L — SIGNIFICANT CHANGE UP (ref 3.5–5.3)
RBC # BLD: 4.83 M/UL — SIGNIFICANT CHANGE UP (ref 3.8–5.2)
RBC # FLD: 14 % — SIGNIFICANT CHANGE UP (ref 10.3–14.5)
SODIUM SERPL-SCNC: 138 MMOL/L — SIGNIFICANT CHANGE UP (ref 135–145)
WBC # BLD: 10.14 K/UL — SIGNIFICANT CHANGE UP (ref 3.8–10.5)
WBC # FLD AUTO: 10.14 K/UL — SIGNIFICANT CHANGE UP (ref 3.8–10.5)

## 2017-03-04 PROCEDURE — 99232 SBSQ HOSP IP/OBS MODERATE 35: CPT | Mod: GC

## 2017-03-04 RX ADMIN — ATORVASTATIN CALCIUM 80 MILLIGRAM(S): 80 TABLET, FILM COATED ORAL at 21:16

## 2017-03-04 RX ADMIN — Medication 100 MILLIGRAM(S): at 21:16

## 2017-03-04 RX ADMIN — LISINOPRIL 20 MILLIGRAM(S): 2.5 TABLET ORAL at 09:32

## 2017-03-04 RX ADMIN — AMLODIPINE BESYLATE 10 MILLIGRAM(S): 2.5 TABLET ORAL at 09:32

## 2017-03-04 RX ADMIN — Medication 81 MILLIGRAM(S): at 11:21

## 2017-03-04 RX ADMIN — Medication 100 MILLIGRAM(S): at 09:32

## 2017-03-05 LAB
BUN SERPL-MCNC: 13 MG/DL — SIGNIFICANT CHANGE UP (ref 7–23)
CALCIUM SERPL-MCNC: 9.5 MG/DL — SIGNIFICANT CHANGE UP (ref 8.4–10.5)
CHLORIDE SERPL-SCNC: 97 MMOL/L — LOW (ref 98–107)
CO2 SERPL-SCNC: 22 MMOL/L — SIGNIFICANT CHANGE UP (ref 22–31)
CREAT SERPL-MCNC: 0.64 MG/DL — SIGNIFICANT CHANGE UP (ref 0.5–1.3)
GLUCOSE SERPL-MCNC: 126 MG/DL — HIGH (ref 70–99)
MAGNESIUM SERPL-MCNC: 1.8 MG/DL — SIGNIFICANT CHANGE UP (ref 1.6–2.6)
POTASSIUM SERPL-MCNC: 3.8 MMOL/L — SIGNIFICANT CHANGE UP (ref 3.5–5.3)
POTASSIUM SERPL-SCNC: 3.8 MMOL/L — SIGNIFICANT CHANGE UP (ref 3.5–5.3)
SODIUM SERPL-SCNC: 138 MMOL/L — SIGNIFICANT CHANGE UP (ref 135–145)

## 2017-03-05 PROCEDURE — 99232 SBSQ HOSP IP/OBS MODERATE 35: CPT | Mod: GC

## 2017-03-05 RX ADMIN — AMLODIPINE BESYLATE 10 MILLIGRAM(S): 2.5 TABLET ORAL at 10:20

## 2017-03-05 RX ADMIN — Medication 100 MILLIGRAM(S): at 10:21

## 2017-03-05 RX ADMIN — Medication 81 MILLIGRAM(S): at 13:21

## 2017-03-05 RX ADMIN — ARIPIPRAZOLE 20 MILLIGRAM(S): 15 TABLET ORAL at 13:21

## 2017-03-05 RX ADMIN — ATORVASTATIN CALCIUM 80 MILLIGRAM(S): 80 TABLET, FILM COATED ORAL at 23:00

## 2017-03-05 RX ADMIN — LISINOPRIL 20 MILLIGRAM(S): 2.5 TABLET ORAL at 10:20

## 2017-03-05 NOTE — PROVIDER CONTACT NOTE (OTHER) - SITUATION
176/92 Pt. hypertensive
ADS contacted back about BP
Patient has high BP still after receiving BP meds earlier in evening
Pt. admitting BP to 5S 194/94
Pt. with clotted CBC in AM

## 2017-03-05 NOTE — PROVIDER CONTACT NOTE (OTHER) - ACTION/TREATMENT ORDERED:
Will not be giving anything for BP as of now, advised to continue to monitor.
Will administer another BP med to be ordered by ADS
As per PA, no need to redraw lab
Norvasc STAT
Will continue to monitor. Admitting MD will come assess patient and put new orders in.

## 2017-03-06 ENCOUNTER — APPOINTMENT (OUTPATIENT)
Dept: RADIATION ONCOLOGY | Facility: CLINIC | Age: 67
End: 2017-03-06

## 2017-03-06 ENCOUNTER — INPATIENT (INPATIENT)
Facility: HOSPITAL | Age: 67
LOS: 22 days | Discharge: ANOTHER HOSPITAL | DRG: 949 | End: 2017-03-29
Attending: PSYCHIATRY & NEUROLOGY | Admitting: PSYCHIATRY & NEUROLOGY
Payer: MEDICARE

## 2017-03-06 VITALS
DIASTOLIC BLOOD PRESSURE: 71 MMHG | TEMPERATURE: 98 F | SYSTOLIC BLOOD PRESSURE: 140 MMHG | OXYGEN SATURATION: 95 % | RESPIRATION RATE: 18 BRPM | HEART RATE: 75 BPM

## 2017-03-06 DIAGNOSIS — Z51.89 ENCOUNTER FOR OTHER SPECIFIED AFTERCARE: ICD-10-CM

## 2017-03-06 DIAGNOSIS — E11.9 TYPE 2 DIABETES MELLITUS WITHOUT COMPLICATIONS: ICD-10-CM

## 2017-03-06 DIAGNOSIS — I63.9 CEREBRAL INFARCTION, UNSPECIFIED: ICD-10-CM

## 2017-03-06 DIAGNOSIS — R13.10 DYSPHAGIA, UNSPECIFIED: ICD-10-CM

## 2017-03-06 DIAGNOSIS — M17.12 UNILATERAL PRIMARY OSTEOARTHRITIS, LEFT KNEE: ICD-10-CM

## 2017-03-06 DIAGNOSIS — F41.9 ANXIETY DISORDER, UNSPECIFIED: ICD-10-CM

## 2017-03-06 DIAGNOSIS — C02.9 MALIGNANT NEOPLASM OF TONGUE, UNSPECIFIED: ICD-10-CM

## 2017-03-06 DIAGNOSIS — E78.00 PURE HYPERCHOLESTEROLEMIA, UNSPECIFIED: ICD-10-CM

## 2017-03-06 DIAGNOSIS — F33.9 MAJOR DEPRESSIVE DISORDER, RECURRENT, UNSPECIFIED: ICD-10-CM

## 2017-03-06 DIAGNOSIS — C77.9 SECONDARY AND UNSPECIFIED MALIGNANT NEOPLASM OF LYMPH NODE, UNSPECIFIED: ICD-10-CM

## 2017-03-06 DIAGNOSIS — I69.354 HEMIPLEGIA AND HEMIPARESIS FOLLOWING CEREBRAL INFARCTION AFFECTING LEFT NON-DOMINANT SIDE: ICD-10-CM

## 2017-03-06 DIAGNOSIS — E78.5 HYPERLIPIDEMIA, UNSPECIFIED: ICD-10-CM

## 2017-03-06 DIAGNOSIS — K59.00 CONSTIPATION, UNSPECIFIED: ICD-10-CM

## 2017-03-06 DIAGNOSIS — R26.9 UNSPECIFIED ABNORMALITIES OF GAIT AND MOBILITY: ICD-10-CM

## 2017-03-06 DIAGNOSIS — I95.9 HYPOTENSION, UNSPECIFIED: ICD-10-CM

## 2017-03-06 DIAGNOSIS — I10 ESSENTIAL (PRIMARY) HYPERTENSION: ICD-10-CM

## 2017-03-06 DIAGNOSIS — J45.909 UNSPECIFIED ASTHMA, UNCOMPLICATED: ICD-10-CM

## 2017-03-06 PROCEDURE — 93010 ELECTROCARDIOGRAM REPORT: CPT

## 2017-03-06 PROCEDURE — 99223 1ST HOSP IP/OBS HIGH 75: CPT

## 2017-03-06 PROCEDURE — 99239 HOSP IP/OBS DSCHRG MGMT >30: CPT

## 2017-03-06 RX ORDER — INFLUENZA VIRUS VACCINE 15; 15; 15; 15 UG/.5ML; UG/.5ML; UG/.5ML; UG/.5ML
0.5 SUSPENSION INTRAMUSCULAR ONCE
Qty: 0 | Refills: 0 | Status: COMPLETED | OUTPATIENT
Start: 2017-03-06 | End: 2017-03-06

## 2017-03-06 RX ADMIN — Medication 81 MILLIGRAM(S): at 12:50

## 2017-03-06 RX ADMIN — INFLUENZA VIRUS VACCINE 0.5 MILLILITER(S): 15; 15; 15; 15 SUSPENSION INTRAMUSCULAR at 12:50

## 2017-03-06 RX ADMIN — LISINOPRIL 20 MILLIGRAM(S): 2.5 TABLET ORAL at 06:25

## 2017-03-06 RX ADMIN — AMLODIPINE BESYLATE 10 MILLIGRAM(S): 2.5 TABLET ORAL at 06:25

## 2017-03-07 PROCEDURE — 99232 SBSQ HOSP IP/OBS MODERATE 35: CPT

## 2017-03-07 PROCEDURE — 90792 PSYCH DIAG EVAL W/MED SRVCS: CPT

## 2017-03-07 PROCEDURE — 99223 1ST HOSP IP/OBS HIGH 75: CPT

## 2017-03-07 PROCEDURE — 93970 EXTREMITY STUDY: CPT | Mod: 26

## 2017-03-08 PROCEDURE — 38505 NEEDLE BIOPSY LYMPH NODES: CPT

## 2017-03-08 PROCEDURE — 99232 SBSQ HOSP IP/OBS MODERATE 35: CPT

## 2017-03-08 PROCEDURE — 76942 ECHO GUIDE FOR BIOPSY: CPT | Mod: 26

## 2017-03-09 ENCOUNTER — MESSAGE (OUTPATIENT)
Age: 67
End: 2017-03-09

## 2017-03-09 PROCEDURE — 99222 1ST HOSP IP/OBS MODERATE 55: CPT

## 2017-03-09 PROCEDURE — 99233 SBSQ HOSP IP/OBS HIGH 50: CPT

## 2017-03-09 PROCEDURE — 99232 SBSQ HOSP IP/OBS MODERATE 35: CPT

## 2017-03-10 ENCOUNTER — MESSAGE (OUTPATIENT)
Age: 67
End: 2017-03-10

## 2017-03-10 PROCEDURE — 99232 SBSQ HOSP IP/OBS MODERATE 35: CPT

## 2017-03-11 PROCEDURE — 99232 SBSQ HOSP IP/OBS MODERATE 35: CPT

## 2017-03-12 PROCEDURE — 99232 SBSQ HOSP IP/OBS MODERATE 35: CPT

## 2017-03-13 PROCEDURE — 73562 X-RAY EXAM OF KNEE 3: CPT | Mod: 26,LT

## 2017-03-13 PROCEDURE — 99232 SBSQ HOSP IP/OBS MODERATE 35: CPT

## 2017-03-13 PROCEDURE — 99233 SBSQ HOSP IP/OBS HIGH 50: CPT

## 2017-03-14 PROCEDURE — 99232 SBSQ HOSP IP/OBS MODERATE 35: CPT

## 2017-03-15 ENCOUNTER — MESSAGE (OUTPATIENT)
Age: 67
End: 2017-03-15

## 2017-03-15 PROCEDURE — 99233 SBSQ HOSP IP/OBS HIGH 50: CPT

## 2017-03-15 PROCEDURE — 99232 SBSQ HOSP IP/OBS MODERATE 35: CPT

## 2017-03-15 PROCEDURE — 96116 NUBHVL XM PHYS/QHP 1ST HR: CPT

## 2017-03-16 PROCEDURE — 99232 SBSQ HOSP IP/OBS MODERATE 35: CPT

## 2017-03-16 PROCEDURE — 77014: CPT | Mod: 26

## 2017-03-16 PROCEDURE — 99223 1ST HOSP IP/OBS HIGH 75: CPT

## 2017-03-17 PROCEDURE — 99232 SBSQ HOSP IP/OBS MODERATE 35: CPT

## 2017-03-18 PROCEDURE — 99232 SBSQ HOSP IP/OBS MODERATE 35: CPT

## 2017-03-19 PROCEDURE — 99232 SBSQ HOSP IP/OBS MODERATE 35: CPT

## 2017-03-20 PROCEDURE — 99232 SBSQ HOSP IP/OBS MODERATE 35: CPT

## 2017-03-20 PROCEDURE — 90832 PSYTX W PT 30 MINUTES: CPT

## 2017-03-20 PROCEDURE — 77470 SPECIAL RADIATION TREATMENT: CPT | Mod: 26

## 2017-03-21 PROCEDURE — 99232 SBSQ HOSP IP/OBS MODERATE 35: CPT

## 2017-03-21 PROCEDURE — 99233 SBSQ HOSP IP/OBS HIGH 50: CPT

## 2017-03-22 PROCEDURE — 77263 THER RADIOLOGY TX PLNG CPLX: CPT

## 2017-03-22 PROCEDURE — 99233 SBSQ HOSP IP/OBS HIGH 50: CPT

## 2017-03-22 PROCEDURE — 99232 SBSQ HOSP IP/OBS MODERATE 35: CPT

## 2017-03-23 PROCEDURE — 77300 RADIATION THERAPY DOSE PLAN: CPT | Mod: 26

## 2017-03-23 PROCEDURE — 99233 SBSQ HOSP IP/OBS HIGH 50: CPT

## 2017-03-23 PROCEDURE — 77338 DESIGN MLC DEVICE FOR IMRT: CPT | Mod: 26

## 2017-03-23 PROCEDURE — 77301 RADIOTHERAPY DOSE PLAN IMRT: CPT | Mod: 26

## 2017-03-23 PROCEDURE — 99232 SBSQ HOSP IP/OBS MODERATE 35: CPT

## 2017-03-24 PROCEDURE — 99232 SBSQ HOSP IP/OBS MODERATE 35: CPT

## 2017-03-25 PROCEDURE — 99232 SBSQ HOSP IP/OBS MODERATE 35: CPT

## 2017-03-26 PROCEDURE — 99232 SBSQ HOSP IP/OBS MODERATE 35: CPT

## 2017-03-27 PROCEDURE — 99232 SBSQ HOSP IP/OBS MODERATE 35: CPT

## 2017-03-27 PROCEDURE — 77280 THER RAD SIMULAJ FIELD SMPL: CPT | Mod: 26

## 2017-03-28 PROCEDURE — 99232 SBSQ HOSP IP/OBS MODERATE 35: CPT

## 2017-03-29 ENCOUNTER — INPATIENT (INPATIENT)
Facility: HOSPITAL | Age: 67
LOS: 9 days | Discharge: NURSING, REHAB OR LONG-TERM CARE | DRG: 392 | End: 2017-04-08
Attending: INTERNAL MEDICINE | Admitting: INTERNAL MEDICINE
Payer: MEDICARE

## 2017-03-29 DIAGNOSIS — J45.909 UNSPECIFIED ASTHMA, UNCOMPLICATED: ICD-10-CM

## 2017-03-29 DIAGNOSIS — E44.0 MODERATE PROTEIN-CALORIE MALNUTRITION: ICD-10-CM

## 2017-03-29 DIAGNOSIS — N39.0 URINARY TRACT INFECTION, SITE NOT SPECIFIED: ICD-10-CM

## 2017-03-29 DIAGNOSIS — Z79.82 LONG TERM (CURRENT) USE OF ASPIRIN: ICD-10-CM

## 2017-03-29 DIAGNOSIS — C02.9 MALIGNANT NEOPLASM OF TONGUE, UNSPECIFIED: ICD-10-CM

## 2017-03-29 DIAGNOSIS — I10 ESSENTIAL (PRIMARY) HYPERTENSION: ICD-10-CM

## 2017-03-29 DIAGNOSIS — I63.9 CEREBRAL INFARCTION, UNSPECIFIED: ICD-10-CM

## 2017-03-29 DIAGNOSIS — E11.9 TYPE 2 DIABETES MELLITUS WITHOUT COMPLICATIONS: ICD-10-CM

## 2017-03-29 DIAGNOSIS — Z91.040 LATEX ALLERGY STATUS: ICD-10-CM

## 2017-03-29 DIAGNOSIS — C78.39 SECONDARY MALIGNANT NEOPLASM OF OTHER RESPIRATORY ORGANS: ICD-10-CM

## 2017-03-29 DIAGNOSIS — I69.322 DYSARTHRIA FOLLOWING CEREBRAL INFARCTION: ICD-10-CM

## 2017-03-29 DIAGNOSIS — F33.2 MAJOR DEPRESSIVE DISORDER, RECURRENT SEVERE WITHOUT PSYCHOTIC FEATURES: ICD-10-CM

## 2017-03-29 DIAGNOSIS — I69.354 HEMIPLEGIA AND HEMIPARESIS FOLLOWING CEREBRAL INFARCTION AFFECTING LEFT NON-DOMINANT SIDE: ICD-10-CM

## 2017-03-29 DIAGNOSIS — C79.89 SECONDARY MALIGNANT NEOPLASM OF OTHER SPECIFIED SITES: ICD-10-CM

## 2017-03-29 DIAGNOSIS — F41.9 ANXIETY DISORDER, UNSPECIFIED: ICD-10-CM

## 2017-03-29 DIAGNOSIS — Z88.2 ALLERGY STATUS TO SULFONAMIDES: ICD-10-CM

## 2017-03-29 DIAGNOSIS — R13.10 DYSPHAGIA, UNSPECIFIED: ICD-10-CM

## 2017-03-29 PROBLEM — E78.5 HYPERLIPIDEMIA, UNSPECIFIED: Chronic | Status: ACTIVE | Noted: 2017-02-20

## 2017-03-29 PROBLEM — F32.9 MAJOR DEPRESSIVE DISORDER, SINGLE EPISODE, UNSPECIFIED: Chronic | Status: ACTIVE | Noted: 2017-02-20

## 2017-03-29 PROCEDURE — 93010 ELECTROCARDIOGRAM REPORT: CPT

## 2017-03-29 PROCEDURE — 99239 HOSP IP/OBS DSCHRG MGMT >30: CPT

## 2017-03-29 PROCEDURE — 90832 PSYTX W PT 30 MINUTES: CPT

## 2017-03-29 PROCEDURE — 99232 SBSQ HOSP IP/OBS MODERATE 35: CPT

## 2017-03-30 ENCOUNTER — TRANSCRIPTION ENCOUNTER (OUTPATIENT)
Age: 67
End: 2017-03-30

## 2017-03-30 PROCEDURE — 92507 TX SP LANG VOICE COMM INDIV: CPT

## 2017-03-30 PROCEDURE — 85025 COMPLETE CBC W/AUTO DIFF WBC: CPT

## 2017-03-30 PROCEDURE — 80076 HEPATIC FUNCTION PANEL: CPT

## 2017-03-30 PROCEDURE — 97140 MANUAL THERAPY 1/> REGIONS: CPT

## 2017-03-30 PROCEDURE — 97163 PT EVAL HIGH COMPLEX 45 MIN: CPT

## 2017-03-30 PROCEDURE — 93005 ELECTROCARDIOGRAM TRACING: CPT

## 2017-03-30 PROCEDURE — 97112 NEUROMUSCULAR REEDUCATION: CPT

## 2017-03-30 PROCEDURE — 92610 EVALUATE SWALLOWING FUNCTION: CPT

## 2017-03-30 PROCEDURE — 77301 RADIOTHERAPY DOSE PLAN IMRT: CPT

## 2017-03-30 PROCEDURE — 82436 ASSAY OF URINE CHLORIDE: CPT

## 2017-03-30 PROCEDURE — 83935 ASSAY OF URINE OSMOLALITY: CPT

## 2017-03-30 PROCEDURE — 73562 X-RAY EXAM OF KNEE 3: CPT

## 2017-03-30 PROCEDURE — 80053 COMPREHEN METABOLIC PANEL: CPT

## 2017-03-30 PROCEDURE — 97167 OT EVAL HIGH COMPLEX 60 MIN: CPT

## 2017-03-30 PROCEDURE — 77300 RADIATION THERAPY DOSE PLAN: CPT

## 2017-03-30 PROCEDURE — 43246 EGD PLACE GASTROSTOMY TUBE: CPT

## 2017-03-30 PROCEDURE — 84443 ASSAY THYROID STIM HORMONE: CPT

## 2017-03-30 PROCEDURE — 97530 THERAPEUTIC ACTIVITIES: CPT

## 2017-03-30 PROCEDURE — 92523 SPEECH SOUND LANG COMPREHEN: CPT

## 2017-03-30 PROCEDURE — 84550 ASSAY OF BLOOD/URIC ACID: CPT

## 2017-03-30 PROCEDURE — 97535 SELF CARE MNGMENT TRAINING: CPT

## 2017-03-30 PROCEDURE — 85027 COMPLETE CBC AUTOMATED: CPT

## 2017-03-30 PROCEDURE — 97110 THERAPEUTIC EXERCISES: CPT

## 2017-03-30 PROCEDURE — 93970 EXTREMITY STUDY: CPT

## 2017-03-30 PROCEDURE — 77470 SPECIAL RADIATION TREATMENT: CPT

## 2017-03-30 PROCEDURE — 99221 1ST HOSP IP/OBS SF/LOW 40: CPT | Mod: 57,25

## 2017-03-30 PROCEDURE — 77280 THER RAD SIMULAJ FIELD SMPL: CPT

## 2017-03-30 PROCEDURE — 77014: CPT

## 2017-03-30 PROCEDURE — 97116 GAIT TRAINING THERAPY: CPT

## 2017-03-30 PROCEDURE — 84300 ASSAY OF URINE SODIUM: CPT

## 2017-03-30 PROCEDURE — 80069 RENAL FUNCTION PANEL: CPT

## 2017-03-30 PROCEDURE — G0480: CPT

## 2017-03-30 PROCEDURE — 77338 DESIGN MLC DEVICE FOR IMRT: CPT

## 2017-03-30 PROCEDURE — 77386: CPT

## 2017-03-30 PROCEDURE — 85610 PROTHROMBIN TIME: CPT

## 2017-03-30 PROCEDURE — 99232 SBSQ HOSP IP/OBS MODERATE 35: CPT

## 2017-03-30 PROCEDURE — C1889: CPT

## 2017-03-31 PROCEDURE — 99232 SBSQ HOSP IP/OBS MODERATE 35: CPT

## 2017-04-01 PROCEDURE — 99232 SBSQ HOSP IP/OBS MODERATE 35: CPT

## 2017-04-02 PROCEDURE — 99232 SBSQ HOSP IP/OBS MODERATE 35: CPT

## 2017-04-03 PROCEDURE — 99232 SBSQ HOSP IP/OBS MODERATE 35: CPT

## 2017-04-04 ENCOUNTER — OUTPATIENT (OUTPATIENT)
Dept: OUTPATIENT SERVICES | Facility: HOSPITAL | Age: 67
LOS: 1 days | Discharge: HOME | End: 2017-04-04

## 2017-04-04 DIAGNOSIS — C02.9 MALIGNANT NEOPLASM OF TONGUE, UNSPECIFIED: ICD-10-CM

## 2017-04-04 PROCEDURE — 99232 SBSQ HOSP IP/OBS MODERATE 35: CPT

## 2017-04-04 PROCEDURE — 77427 RADIATION TX MANAGEMENT X5: CPT

## 2017-04-05 ENCOUNTER — APPOINTMENT (OUTPATIENT)
Dept: HEMATOLOGY ONCOLOGY | Facility: CLINIC | Age: 67
End: 2017-04-05

## 2017-04-05 PROCEDURE — 99232 SBSQ HOSP IP/OBS MODERATE 35: CPT

## 2017-04-06 PROCEDURE — 99232 SBSQ HOSP IP/OBS MODERATE 35: CPT

## 2017-04-07 PROCEDURE — 99231 SBSQ HOSP IP/OBS SF/LOW 25: CPT

## 2017-04-07 PROCEDURE — 99232 SBSQ HOSP IP/OBS MODERATE 35: CPT

## 2017-04-08 PROCEDURE — 97166 OT EVAL MOD COMPLEX 45 MIN: CPT

## 2017-04-08 PROCEDURE — 77336 RADIATION PHYSICS CONSULT: CPT

## 2017-04-08 PROCEDURE — 77417 THER RADIOLOGY PORT IMAGE(S): CPT

## 2017-04-08 PROCEDURE — 97162 PT EVAL MOD COMPLEX 30 MIN: CPT

## 2017-04-08 PROCEDURE — 97530 THERAPEUTIC ACTIVITIES: CPT

## 2017-04-08 PROCEDURE — 83036 HEMOGLOBIN GLYCOSYLATED A1C: CPT

## 2017-04-08 PROCEDURE — 85610 PROTHROMBIN TIME: CPT

## 2017-04-08 PROCEDURE — 87086 URINE CULTURE/COLONY COUNT: CPT

## 2017-04-08 PROCEDURE — 97116 GAIT TRAINING THERAPY: CPT

## 2017-04-08 PROCEDURE — 85730 THROMBOPLASTIN TIME PARTIAL: CPT

## 2017-04-08 PROCEDURE — 85027 COMPLETE CBC AUTOMATED: CPT

## 2017-04-08 PROCEDURE — 77386: CPT

## 2017-04-08 PROCEDURE — 80069 RENAL FUNCTION PANEL: CPT

## 2017-04-08 PROCEDURE — 93005 ELECTROCARDIOGRAM TRACING: CPT

## 2017-04-08 PROCEDURE — 81003 URINALYSIS AUTO W/O SCOPE: CPT

## 2017-04-08 PROCEDURE — 85025 COMPLETE CBC W/AUTO DIFF WBC: CPT

## 2017-04-08 PROCEDURE — 80048 BASIC METABOLIC PNL TOTAL CA: CPT

## 2017-04-10 ENCOUNTER — APPOINTMENT (OUTPATIENT)
Dept: HEMATOLOGY ONCOLOGY | Facility: CLINIC | Age: 67
End: 2017-04-10

## 2017-04-10 VITALS — HEART RATE: 88 BPM | SYSTOLIC BLOOD PRESSURE: 130 MMHG | DIASTOLIC BLOOD PRESSURE: 60 MMHG | WEIGHT: 160 LBS

## 2017-04-10 VITALS — HEIGHT: 67 IN | BODY MASS INDEX: 25.11 KG/M2 | WEIGHT: 160 LBS

## 2017-04-10 DIAGNOSIS — F41.9 ANXIETY DISORDER, UNSPECIFIED: ICD-10-CM

## 2017-04-10 DIAGNOSIS — Z86.39 PERSONAL HISTORY OF OTHER ENDOCRINE, NUTRITIONAL AND METABOLIC DISEASE: ICD-10-CM

## 2017-04-10 DIAGNOSIS — Z86.79 PERSONAL HISTORY OF OTHER DISEASES OF THE CIRCULATORY SYSTEM: ICD-10-CM

## 2017-04-10 DIAGNOSIS — F32.9 ANXIETY DISORDER, UNSPECIFIED: ICD-10-CM

## 2017-04-10 DIAGNOSIS — Z87.09 PERSONAL HISTORY OF OTHER DISEASES OF THE RESPIRATORY SYSTEM: ICD-10-CM

## 2017-04-10 DIAGNOSIS — I63.9 CEREBRAL INFARCTION, UNSPECIFIED: ICD-10-CM

## 2017-04-10 RX ORDER — ARIPIPRAZOLE 20 MG/1
20 TABLET ORAL
Qty: 30 | Refills: 0 | Status: ACTIVE | COMMUNITY
Start: 2016-08-17

## 2017-04-10 RX ORDER — CISPLATIN 1 MG/ML
100 INJECTION, SOLUTION INTRAVENOUS
Refills: 0 | Status: ACTIVE | COMMUNITY
Start: 2017-04-10

## 2017-04-10 RX ORDER — IMIPRAMINE HYDROCHLORIDE 50 MG/1
50 TABLET ORAL
Qty: 120 | Refills: 0 | Status: ACTIVE | COMMUNITY
Start: 2016-08-23

## 2017-04-11 ENCOUNTER — OUTPATIENT (OUTPATIENT)
Dept: OUTPATIENT SERVICES | Facility: HOSPITAL | Age: 67
LOS: 1 days | End: 2017-04-11
Payer: MEDICARE

## 2017-04-11 DIAGNOSIS — C02.8 MALIGNANT NEOPLASM OF OVERLAPPING SITES OF TONGUE: ICD-10-CM

## 2017-04-12 ENCOUNTER — OTHER (OUTPATIENT)
Age: 67
End: 2017-04-12

## 2017-04-13 PROCEDURE — 77427 RADIATION TX MANAGEMENT X5: CPT

## 2017-04-19 ENCOUNTER — APPOINTMENT (OUTPATIENT)
Dept: INFUSION THERAPY | Facility: HOSPITAL | Age: 67
End: 2017-04-19

## 2017-04-19 ENCOUNTER — OUTPATIENT (OUTPATIENT)
Dept: OUTPATIENT SERVICES | Facility: HOSPITAL | Age: 67
LOS: 1 days | End: 2017-04-19
Payer: MEDICARE

## 2017-04-19 DIAGNOSIS — Z51.11 ENCOUNTER FOR ANTINEOPLASTIC CHEMOTHERAPY: ICD-10-CM

## 2017-04-19 DIAGNOSIS — C02.9 MALIGNANT NEOPLASM OF TONGUE, UNSPECIFIED: ICD-10-CM

## 2017-04-19 DIAGNOSIS — R11.2 NAUSEA WITH VOMITING, UNSPECIFIED: ICD-10-CM

## 2017-04-24 ENCOUNTER — APPOINTMENT (OUTPATIENT)
Dept: HEMATOLOGY ONCOLOGY | Facility: CLINIC | Age: 67
End: 2017-04-24

## 2017-04-24 VITALS — HEART RATE: 88 BPM | SYSTOLIC BLOOD PRESSURE: 144 MMHG | DIASTOLIC BLOOD PRESSURE: 64 MMHG | TEMPERATURE: 99.5 F

## 2017-04-26 ENCOUNTER — APPOINTMENT (OUTPATIENT)
Dept: INFUSION THERAPY | Facility: HOSPITAL | Age: 67
End: 2017-04-26

## 2017-04-26 PROCEDURE — 77280 THER RAD SIMULAJ FIELD SMPL: CPT | Mod: 26

## 2017-04-27 PROCEDURE — 77427 RADIATION TX MANAGEMENT X5: CPT

## 2017-05-03 ENCOUNTER — APPOINTMENT (OUTPATIENT)
Dept: INFUSION THERAPY | Facility: HOSPITAL | Age: 67
End: 2017-05-03

## 2017-05-03 PROCEDURE — 77427 RADIATION TX MANAGEMENT X5: CPT

## 2017-05-09 PROCEDURE — 77427 RADIATION TX MANAGEMENT X5: CPT

## 2017-05-10 ENCOUNTER — APPOINTMENT (OUTPATIENT)
Dept: HEMATOLOGY ONCOLOGY | Facility: CLINIC | Age: 67
End: 2017-05-10

## 2017-05-10 ENCOUNTER — APPOINTMENT (OUTPATIENT)
Dept: INFUSION THERAPY | Facility: HOSPITAL | Age: 67
End: 2017-05-10

## 2017-05-10 VITALS
HEART RATE: 88 BPM | RESPIRATION RATE: 16 BRPM | SYSTOLIC BLOOD PRESSURE: 148 MMHG | TEMPERATURE: 99.1 F | DIASTOLIC BLOOD PRESSURE: 77 MMHG

## 2017-05-10 DIAGNOSIS — Z00.00 ENCOUNTER FOR GENERAL ADULT MEDICAL EXAMINATION W/OUT ABNORMAL FINDINGS: ICD-10-CM

## 2017-05-10 PROCEDURE — 77417 THER RADIOLOGY PORT IMAGE(S): CPT

## 2017-05-10 PROCEDURE — 96413 CHEMO IV INFUSION 1 HR: CPT

## 2017-05-10 PROCEDURE — 96375 TX/PRO/DX INJ NEW DRUG ADDON: CPT

## 2017-05-10 PROCEDURE — 77336 RADIATION PHYSICS CONSULT: CPT

## 2017-05-10 PROCEDURE — 96361 HYDRATE IV INFUSION ADD-ON: CPT

## 2017-05-10 PROCEDURE — 77280 THER RAD SIMULAJ FIELD SMPL: CPT

## 2017-05-10 PROCEDURE — 36415 COLL VENOUS BLD VENIPUNCTURE: CPT

## 2017-05-10 PROCEDURE — 77386: CPT

## 2017-05-10 PROCEDURE — 96367 TX/PROPH/DG ADDL SEQ IV INF: CPT

## 2017-05-10 PROCEDURE — 83735 ASSAY OF MAGNESIUM: CPT

## 2017-05-31 ENCOUNTER — INPATIENT (INPATIENT)
Facility: HOSPITAL | Age: 67
LOS: 0 days | Discharge: NURSING, REHAB OR LONG-TERM CARE | DRG: 395 | End: 2017-05-31
Attending: INTERNAL MEDICINE | Admitting: INTERNAL MEDICINE
Payer: MEDICARE

## 2017-05-31 ENCOUNTER — TRANSCRIPTION ENCOUNTER (OUTPATIENT)
Age: 67
End: 2017-05-31

## 2017-05-31 DIAGNOSIS — Z66 DO NOT RESUSCITATE: ICD-10-CM

## 2017-05-31 DIAGNOSIS — Z91.040 LATEX ALLERGY STATUS: ICD-10-CM

## 2017-05-31 DIAGNOSIS — R13.10 DYSPHAGIA, UNSPECIFIED: ICD-10-CM
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
## 2017-05-31 DIAGNOSIS — Z86.73 PERSONAL HISTORY OF TRANSIENT ISCHEMIC ATTACK (TIA), AND CEREBRAL INFARCTION WITHOUT RESIDUAL DEFICITS: ICD-10-CM

## 2017-05-31 DIAGNOSIS — Z88.2 ALLERGY STATUS TO SULFONAMIDES: ICD-10-CM

## 2017-05-31 DIAGNOSIS — K94.23 GASTROSTOMY MALFUNCTION: ICD-10-CM
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
## 2017-05-31 DIAGNOSIS — Z79.82 LONG TERM (CURRENT) USE OF ASPIRIN: ICD-10-CM

## 2017-05-31 DIAGNOSIS — C02.9 MALIGNANT NEOPLASM OF TONGUE, UNSPECIFIED: ICD-10-CM

## 2017-05-31 DIAGNOSIS — E78.00 PURE HYPERCHOLESTEROLEMIA, UNSPECIFIED: ICD-10-CM

## 2017-05-31 DIAGNOSIS — I10 ESSENTIAL (PRIMARY) HYPERTENSION: ICD-10-CM
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
## 2017-05-31 PROCEDURE — 36415 COLL VENOUS BLD VENIPUNCTURE: CPT

## 2017-05-31 PROCEDURE — 85610 PROTHROMBIN TIME: CPT

## 2017-05-31 PROCEDURE — 85730 THROMBOPLASTIN TIME PARTIAL: CPT

## 2017-05-31 PROCEDURE — C1889: CPT

## 2017-05-31 PROCEDURE — 99284 EMERGENCY DEPT VISIT MOD MDM: CPT | Mod: 25

## 2017-05-31 PROCEDURE — 85027 COMPLETE CBC AUTOMATED: CPT

## 2017-05-31 PROCEDURE — 93010 ELECTROCARDIOGRAM REPORT: CPT

## 2017-05-31 PROCEDURE — 80048 BASIC METABOLIC PNL TOTAL CA: CPT

## 2017-06-13 ENCOUNTER — APPOINTMENT (OUTPATIENT)
Dept: HEMATOLOGY ONCOLOGY | Facility: CLINIC | Age: 67
End: 2017-06-13

## 2017-06-13 VITALS
WEIGHT: 165 LBS | SYSTOLIC BLOOD PRESSURE: 140 MMHG | BODY MASS INDEX: 25.84 KG/M2 | DIASTOLIC BLOOD PRESSURE: 70 MMHG | HEART RATE: 72 BPM

## 2017-06-13 DIAGNOSIS — D64.9 ANEMIA, UNSPECIFIED: ICD-10-CM

## 2017-06-13 DIAGNOSIS — E83.42 HYPOMAGNESEMIA: ICD-10-CM

## 2017-06-13 DIAGNOSIS — C02.9 MALIGNANT NEOPLASM OF TONGUE, UNSPECIFIED: ICD-10-CM

## 2017-06-13 RX ORDER — METFORMIN HYDROCHLORIDE 500 MG/1
500 TABLET, COATED ORAL
Refills: 0 | Status: ACTIVE | COMMUNITY

## 2017-06-13 RX ORDER — FAMOTIDINE 20 MG/1
20 TABLET, FILM COATED ORAL
Refills: 0 | Status: ACTIVE | COMMUNITY

## 2017-06-13 RX ORDER — METOCLOPRAMIDE HYDROCHLORIDE 10 MG/1
10 TABLET ORAL
Refills: 0 | Status: ACTIVE | COMMUNITY

## 2017-06-13 RX ORDER — ASPIRIN 81 MG
81 TABLET, DELAYED RELEASE (ENTERIC COATED) ORAL
Refills: 0 | Status: ACTIVE | COMMUNITY

## 2017-06-13 RX ORDER — ATORVASTATIN CALCIUM 20 MG/1
20 TABLET, FILM COATED ORAL
Refills: 0 | Status: ACTIVE | COMMUNITY

## 2017-06-13 RX ORDER — HYDRALAZINE HYDROCHLORIDE 25 MG/1
25 TABLET ORAL
Refills: 0 | Status: ACTIVE | COMMUNITY

## 2017-06-13 RX ORDER — LORAZEPAM 0.5 MG/1
0.5 TABLET ORAL
Refills: 0 | Status: ACTIVE | COMMUNITY

## 2017-06-13 RX ORDER — CHOLESTYRAMINE 4 G/9G
4 POWDER, FOR SUSPENSION ORAL
Refills: 0 | Status: ACTIVE | COMMUNITY

## 2017-06-13 RX ORDER — ALLOPURINOL 100 MG/1
100 TABLET ORAL
Refills: 0 | Status: ACTIVE | COMMUNITY

## 2017-06-13 RX ORDER — MAGNESIUM OXIDE 241.3 MG/1000MG
400 TABLET ORAL TWICE DAILY
Qty: 60 | Refills: 0 | Status: ACTIVE | COMMUNITY
Start: 2017-06-13

## 2017-06-14 ENCOUNTER — APPOINTMENT (OUTPATIENT)
Dept: RADIATION ONCOLOGY | Facility: CLINIC | Age: 67
End: 2017-06-14

## 2017-06-28 ENCOUNTER — APPOINTMENT (OUTPATIENT)
Dept: RADIATION ONCOLOGY | Facility: CLINIC | Age: 67
End: 2017-06-28

## 2017-07-05 ENCOUNTER — APPOINTMENT (OUTPATIENT)
Dept: RADIATION ONCOLOGY | Facility: CLINIC | Age: 67
End: 2017-07-05

## 2017-07-05 VITALS
DIASTOLIC BLOOD PRESSURE: 70 MMHG | SYSTOLIC BLOOD PRESSURE: 114 MMHG | RESPIRATION RATE: 15 BRPM | OXYGEN SATURATION: 93 % | HEIGHT: 67 IN | HEART RATE: 84 BPM | TEMPERATURE: 98.4 F

## 2017-07-13 ENCOUNTER — INPATIENT (INPATIENT)
Facility: HOSPITAL | Age: 67
LOS: 16 days | Discharge: HOSPICE | DRG: 641 | End: 2017-07-30
Attending: INTERNAL MEDICINE | Admitting: HOSPITALIST
Payer: MEDICARE

## 2017-07-13 DIAGNOSIS — K21.9 GASTRO-ESOPHAGEAL REFLUX DISEASE WITHOUT ESOPHAGITIS: ICD-10-CM

## 2017-07-13 DIAGNOSIS — Z79.4 LONG TERM (CURRENT) USE OF INSULIN: ICD-10-CM

## 2017-07-13 DIAGNOSIS — R13.12 DYSPHAGIA, OROPHARYNGEAL PHASE: ICD-10-CM

## 2017-07-13 DIAGNOSIS — E11.9 TYPE 2 DIABETES MELLITUS WITHOUT COMPLICATIONS: ICD-10-CM

## 2017-07-13 DIAGNOSIS — F41.9 ANXIETY DISORDER, UNSPECIFIED: ICD-10-CM

## 2017-07-13 DIAGNOSIS — Z88.2 ALLERGY STATUS TO SULFONAMIDES: ICD-10-CM

## 2017-07-13 DIAGNOSIS — I24.9 ACUTE ISCHEMIC HEART DISEASE, UNSPECIFIED: ICD-10-CM

## 2017-07-13 DIAGNOSIS — Z92.21 PERSONAL HISTORY OF ANTINEOPLASTIC CHEMOTHERAPY: ICD-10-CM

## 2017-07-13 DIAGNOSIS — C02.9 MALIGNANT NEOPLASM OF TONGUE, UNSPECIFIED: ICD-10-CM

## 2017-07-13 DIAGNOSIS — D63.0 ANEMIA IN NEOPLASTIC DISEASE: ICD-10-CM

## 2017-07-13 DIAGNOSIS — Z86.73 PERSONAL HISTORY OF TRANSIENT ISCHEMIC ATTACK (TIA), AND CEREBRAL INFARCTION WITHOUT RESIDUAL DEFICITS: ICD-10-CM

## 2017-07-13 DIAGNOSIS — Z66 DO NOT RESUSCITATE: ICD-10-CM

## 2017-07-13 DIAGNOSIS — I10 ESSENTIAL (PRIMARY) HYPERTENSION: ICD-10-CM

## 2017-07-13 DIAGNOSIS — C78.00 SECONDARY MALIGNANT NEOPLASM OF UNSPECIFIED LUNG: ICD-10-CM

## 2017-07-13 DIAGNOSIS — Z51.5 ENCOUNTER FOR PALLIATIVE CARE: ICD-10-CM

## 2017-07-13 DIAGNOSIS — Z91.040 LATEX ALLERGY STATUS: ICD-10-CM

## 2017-07-13 DIAGNOSIS — R07.9 CHEST PAIN, UNSPECIFIED: ICD-10-CM

## 2017-07-13 DIAGNOSIS — J45.909 UNSPECIFIED ASTHMA, UNCOMPLICATED: ICD-10-CM

## 2017-07-13 DIAGNOSIS — F32.9 MAJOR DEPRESSIVE DISORDER, SINGLE EPISODE, UNSPECIFIED: ICD-10-CM

## 2017-07-13 DIAGNOSIS — E78.5 HYPERLIPIDEMIA, UNSPECIFIED: ICD-10-CM

## 2017-07-13 DIAGNOSIS — M10.9 GOUT, UNSPECIFIED: ICD-10-CM

## 2017-07-13 DIAGNOSIS — Z92.3 PERSONAL HISTORY OF IRRADIATION: ICD-10-CM

## 2017-07-13 DIAGNOSIS — Z93.1 GASTROSTOMY STATUS: ICD-10-CM

## 2017-07-13 DIAGNOSIS — E83.52 HYPERCALCEMIA: ICD-10-CM

## 2017-07-13 PROCEDURE — 71010: CPT | Mod: 26

## 2017-07-13 PROCEDURE — 71275 CT ANGIOGRAPHY CHEST: CPT | Mod: 26

## 2017-07-13 PROCEDURE — 93010 ELECTROCARDIOGRAM REPORT: CPT

## 2017-07-13 PROCEDURE — 99223 1ST HOSP IP/OBS HIGH 75: CPT

## 2017-07-14 PROCEDURE — 93306 TTE W/DOPPLER COMPLETE: CPT | Mod: 26

## 2017-07-14 PROCEDURE — 99232 SBSQ HOSP IP/OBS MODERATE 35: CPT

## 2017-07-15 PROCEDURE — 73564 X-RAY EXAM KNEE 4 OR MORE: CPT | Mod: 26,LT

## 2017-07-15 PROCEDURE — 99232 SBSQ HOSP IP/OBS MODERATE 35: CPT

## 2017-07-16 PROCEDURE — 99232 SBSQ HOSP IP/OBS MODERATE 35: CPT

## 2017-07-16 PROCEDURE — 99223 1ST HOSP IP/OBS HIGH 75: CPT

## 2017-07-17 PROCEDURE — 99232 SBSQ HOSP IP/OBS MODERATE 35: CPT

## 2017-07-18 PROCEDURE — 99232 SBSQ HOSP IP/OBS MODERATE 35: CPT

## 2017-07-18 PROCEDURE — 74230 X-RAY XM SWLNG FUNCJ C+: CPT | Mod: 26

## 2017-07-19 PROCEDURE — 99232 SBSQ HOSP IP/OBS MODERATE 35: CPT

## 2017-07-20 PROCEDURE — 99232 SBSQ HOSP IP/OBS MODERATE 35: CPT

## 2017-07-20 PROCEDURE — 74000: CPT | Mod: 26

## 2017-07-21 PROCEDURE — 99232 SBSQ HOSP IP/OBS MODERATE 35: CPT

## 2017-07-22 PROCEDURE — 99232 SBSQ HOSP IP/OBS MODERATE 35: CPT

## 2017-07-23 PROCEDURE — 70450 CT HEAD/BRAIN W/O DYE: CPT | Mod: 26

## 2017-07-23 PROCEDURE — 99232 SBSQ HOSP IP/OBS MODERATE 35: CPT

## 2017-07-24 PROCEDURE — 99232 SBSQ HOSP IP/OBS MODERATE 35: CPT

## 2017-07-25 PROCEDURE — 99232 SBSQ HOSP IP/OBS MODERATE 35: CPT

## 2017-07-26 PROCEDURE — 99232 SBSQ HOSP IP/OBS MODERATE 35: CPT

## 2017-07-27 PROCEDURE — 99232 SBSQ HOSP IP/OBS MODERATE 35: CPT

## 2017-07-28 PROCEDURE — 99231 SBSQ HOSP IP/OBS SF/LOW 25: CPT

## 2017-07-28 PROCEDURE — 99232 SBSQ HOSP IP/OBS MODERATE 35: CPT

## 2017-07-30 PROCEDURE — 71045 X-RAY EXAM CHEST 1 VIEW: CPT

## 2017-07-30 PROCEDURE — 97110 THERAPEUTIC EXERCISES: CPT

## 2017-07-30 PROCEDURE — 80061 LIPID PANEL: CPT

## 2017-07-30 PROCEDURE — 70450 CT HEAD/BRAIN W/O DYE: CPT

## 2017-07-30 PROCEDURE — 83036 HEMOGLOBIN GLYCOSYLATED A1C: CPT

## 2017-07-30 PROCEDURE — 74230 X-RAY XM SWLNG FUNCJ C+: CPT

## 2017-07-30 PROCEDURE — 84484 ASSAY OF TROPONIN QUANT: CPT

## 2017-07-30 PROCEDURE — 82728 ASSAY OF FERRITIN: CPT

## 2017-07-30 PROCEDURE — 82272 OCCULT BLD FECES 1-3 TESTS: CPT

## 2017-07-30 PROCEDURE — 85025 COMPLETE CBC W/AUTO DIFF WBC: CPT

## 2017-07-30 PROCEDURE — 99285 EMERGENCY DEPT VISIT HI MDM: CPT | Mod: 25

## 2017-07-30 PROCEDURE — 83735 ASSAY OF MAGNESIUM: CPT

## 2017-07-30 PROCEDURE — 82962 GLUCOSE BLOOD TEST: CPT

## 2017-07-30 PROCEDURE — 92610 EVALUATE SWALLOWING FUNCTION: CPT

## 2017-07-30 PROCEDURE — 87086 URINE CULTURE/COLONY COUNT: CPT

## 2017-07-30 PROCEDURE — 74018 RADEX ABDOMEN 1 VIEW: CPT

## 2017-07-30 PROCEDURE — 82553 CREATINE MB FRACTION: CPT

## 2017-07-30 PROCEDURE — 82607 VITAMIN B-12: CPT

## 2017-07-30 PROCEDURE — 85045 AUTOMATED RETICULOCYTE COUNT: CPT

## 2017-07-30 PROCEDURE — 96374 THER/PROPH/DIAG INJ IV PUSH: CPT

## 2017-07-30 PROCEDURE — 97162 PT EVAL MOD COMPLEX 30 MIN: CPT

## 2017-07-30 PROCEDURE — 80048 BASIC METABOLIC PNL TOTAL CA: CPT

## 2017-07-30 PROCEDURE — 86140 C-REACTIVE PROTEIN: CPT

## 2017-07-30 PROCEDURE — 97530 THERAPEUTIC ACTIVITIES: CPT

## 2017-07-30 PROCEDURE — 82550 ASSAY OF CK (CPK): CPT

## 2017-07-30 PROCEDURE — 73564 X-RAY EXAM KNEE 4 OR MORE: CPT

## 2017-07-30 PROCEDURE — 93005 ELECTROCARDIOGRAM TRACING: CPT

## 2017-07-30 PROCEDURE — 85730 THROMBOPLASTIN TIME PARTIAL: CPT

## 2017-07-30 PROCEDURE — 92611 MOTION FLUOROSCOPY/SWALLOW: CPT

## 2017-07-30 PROCEDURE — 96361 HYDRATE IV INFUSION ADD-ON: CPT

## 2017-07-30 PROCEDURE — 83880 ASSAY OF NATRIURETIC PEPTIDE: CPT

## 2017-07-30 PROCEDURE — 84100 ASSAY OF PHOSPHORUS: CPT

## 2017-07-30 PROCEDURE — 93306 TTE W/DOPPLER COMPLETE: CPT

## 2017-07-30 PROCEDURE — 85652 RBC SED RATE AUTOMATED: CPT

## 2017-07-30 PROCEDURE — 85610 PROTHROMBIN TIME: CPT

## 2017-07-30 PROCEDURE — 80053 COMPREHEN METABOLIC PANEL: CPT

## 2017-07-30 PROCEDURE — 71275 CT ANGIOGRAPHY CHEST: CPT

## 2017-07-30 PROCEDURE — 83550 IRON BINDING TEST: CPT

## 2017-07-30 PROCEDURE — 82310 ASSAY OF CALCIUM: CPT

## 2017-07-30 PROCEDURE — 92526 ORAL FUNCTION THERAPY: CPT

## 2017-07-30 PROCEDURE — 85027 COMPLETE CBC AUTOMATED: CPT

## 2017-07-30 PROCEDURE — 80069 RENAL FUNCTION PANEL: CPT

## 2017-07-30 PROCEDURE — 81003 URINALYSIS AUTO W/O SCOPE: CPT

## 2017-08-15 ENCOUNTER — MESSAGE (OUTPATIENT)
Age: 67
End: 2017-08-15

## 2017-08-16 ENCOUNTER — APPOINTMENT (OUTPATIENT)
Dept: HEMATOLOGY ONCOLOGY | Facility: CLINIC | Age: 67
End: 2017-08-16

## 2017-10-12 NOTE — ED PROVIDER NOTE - DISPOSITION TYPE
ADMIT
xray shoulder, 3 view:  R shoulder dislocation reduced successfully, no bony displacement, no acute fracture

## 2018-02-26 NOTE — ED ADULT NURSE NOTE - PMH
The patient is a 20y Female complaining of lump in breast. Anxiety    Depression    HLD (hyperlipidemia)    HTN (hypertension)

## 2019-06-18 NOTE — DISCHARGE NOTE ADULT - PROVIDER TOKENS
room air TOKCONTRERAS:'[id number]' TOKEN:'[id number]' TOKEN:'[id number]',TOKEN:'[id number]'

## 2021-12-16 NOTE — PATIENT PROFILE ADULT. - NS PRO PT RIGHT SUPPORT PERSON
Problem: Wound:  Goal: Will show signs of wound healing; wound closure and no evidence of infection  Description: Will show signs of wound healing; wound closure and no evidence of infection  Outcome: Ongoing Yes

## 2024-11-26 NOTE — PATIENT PROFILE ADULT. - FUNCTIONAL SCREEN CURRENT LEVEL: AMBULATION, MLM
by mouth daily, Disp-90 tablet, R-3Normal      losartan (COZAAR) 100 MG tablet Take 1 tablet by mouth daily, Disp-90 tablet, R-3Normal      amLODIPine (NORVASC) 5 MG tablet Take 1 tablet by mouth daily, Disp-90 tablet, R-3Normal      cloNIDine (CATAPRES) 0.2 MG tablet Take 2 tablets by mouth nightly, Disp-180 tablet, R-3Normal      hydrALAZINE (APRESOLINE) 50 MG tablet Take 1 tablet by mouth 3 times daily, Disp-180 tablet, R-3Delete previous rx of qidNormal      olmesartan (BENICAR) 40 MG tablet Take 1 tablet by mouth daily, Disp-90 tablet, R-3Normal      rosuvastatin (CRESTOR) 5 MG tablet Take 1 tablet by mouth nightly, Disp-90 tablet, R-3Normal             ALLERGIES     Atorvastatin, Metformin, Pravastatin, Amoxicillin, and Aspirin    FAMILY HISTORY       Family History   Problem Relation Age of Onset    No Known Problems Father           SOCIAL HISTORY       Social History     Socioeconomic History    Marital status:     Number of children: 2    Highest education level: Associate degree: academic program   Tobacco Use    Smoking status: Never    Smokeless tobacco: Never   Vaping Use    Vaping status: Never Used   Substance and Sexual Activity    Alcohol use: No     Alcohol/week: 0.0 standard drinks of alcohol    Drug use: No    Sexual activity: Not Currently   Social History Narrative    urgical History: [facility] colonoscopy     Hospitalization/Major Diagnostic Procedure: Denies Past Hospitalization    Medical History: HTNTMJ dysfunctionHAsCecal lipoma negative head CT August 2013 CT head w ith contrast unremarkable  except progressive splenoid sinusitisAugust 2013 CT chest unremarkable  Gyn History: Last mammogram date 2012.  S                Social History: Alcohol Use Patient does not use alcohol. Smoking Status Patient is a never smoker.             Marital Status:  x 3 - 2 divorce and 2nd . Lives w ith:  Spouse.- ca prostate no Children. Occupation/W ork: 
(2) assistive person
